# Patient Record
Sex: MALE | Race: BLACK OR AFRICAN AMERICAN | NOT HISPANIC OR LATINO | Employment: PART TIME | URBAN - METROPOLITAN AREA
[De-identification: names, ages, dates, MRNs, and addresses within clinical notes are randomized per-mention and may not be internally consistent; named-entity substitution may affect disease eponyms.]

---

## 2022-04-21 PROBLEM — F60.2 ANTISOCIAL PERSONALITY DISORDER: Status: ACTIVE | Noted: 2017-09-06

## 2022-04-21 PROBLEM — F25.9 SCHIZOAFFECTIVE SCHIZOPHRENIA: Status: ACTIVE | Noted: 2022-04-21

## 2022-04-21 PROBLEM — F12.20 CANNABIS USE DISORDER, MODERATE, DEPENDENCE: Status: ACTIVE | Noted: 2017-06-08

## 2022-04-21 PROBLEM — F31.9 BIPOLAR 1 DISORDER: Status: ACTIVE | Noted: 2022-04-21

## 2022-04-21 PROBLEM — Z72.0 TOBACCO USER: Status: ACTIVE | Noted: 2022-04-21

## 2022-04-21 PROBLEM — M25.511 ACUTE PAIN OF RIGHT SHOULDER: Status: ACTIVE | Noted: 2022-04-21

## 2022-04-21 PROBLEM — F19.151: Status: ACTIVE | Noted: 2017-06-08

## 2022-04-21 PROBLEM — F99 PSYCHIATRIC ILLNESS: Status: ACTIVE | Noted: 2022-04-21

## 2022-04-21 PROBLEM — F19.94: Status: ACTIVE | Noted: 2017-06-08

## 2022-04-21 PROBLEM — R45.851 SUICIDAL IDEATION: Status: ACTIVE | Noted: 2022-04-21

## 2022-04-21 PROBLEM — F15.10 AMPHETAMINE ABUSE: Status: ACTIVE | Noted: 2022-04-21

## 2023-06-21 ENCOUNTER — HOSPITAL ENCOUNTER (INPATIENT)
Facility: HOSPITAL | Age: 38
LOS: 5 days | Discharge: HOME | DRG: 885 | End: 2023-06-26
Attending: PSYCHIATRY & NEUROLOGY | Admitting: PSYCHIATRY & NEUROLOGY
Payer: MEDICAID

## 2023-06-21 DIAGNOSIS — F32.9 MDD (MAJOR DEPRESSIVE DISORDER): ICD-10-CM

## 2023-06-21 PROCEDURE — 11400000 HC PSYCH PRIVATE ROOM

## 2023-06-21 PROCEDURE — 25000003 PHARM REV CODE 250: Performed by: PSYCHIATRY & NEUROLOGY

## 2023-06-21 RX ORDER — LORAZEPAM 2 MG/ML
2 INJECTION INTRAMUSCULAR EVERY 4 HOURS PRN
Status: DISCONTINUED | OUTPATIENT
Start: 2023-06-21 | End: 2023-06-26 | Stop reason: HOSPADM

## 2023-06-21 RX ORDER — MAG HYDROX/ALUMINUM HYD/SIMETH 200-200-20
30 SUSPENSION, ORAL (FINAL DOSE FORM) ORAL EVERY 6 HOURS PRN
Status: DISCONTINUED | OUTPATIENT
Start: 2023-06-21 | End: 2023-06-26 | Stop reason: HOSPADM

## 2023-06-21 RX ORDER — TRAZODONE HYDROCHLORIDE 100 MG/1
100 TABLET ORAL NIGHTLY PRN
Status: DISCONTINUED | OUTPATIENT
Start: 2023-06-21 | End: 2023-06-26 | Stop reason: HOSPADM

## 2023-06-21 RX ORDER — ADHESIVE BANDAGE
30 BANDAGE TOPICAL DAILY PRN
Status: DISCONTINUED | OUTPATIENT
Start: 2023-06-21 | End: 2023-06-26 | Stop reason: HOSPADM

## 2023-06-21 RX ORDER — HYDROXYZINE HYDROCHLORIDE 50 MG/1
50 TABLET, FILM COATED ORAL EVERY 4 HOURS PRN
Status: DISCONTINUED | OUTPATIENT
Start: 2023-06-21 | End: 2023-06-26 | Stop reason: HOSPADM

## 2023-06-21 RX ORDER — LORAZEPAM 1 MG/1
2 TABLET ORAL EVERY 4 HOURS PRN
Status: DISCONTINUED | OUTPATIENT
Start: 2023-06-21 | End: 2023-06-26 | Stop reason: HOSPADM

## 2023-06-21 RX ORDER — DIPHENHYDRAMINE HCL 50 MG
50 CAPSULE ORAL EVERY 4 HOURS PRN
Status: DISCONTINUED | OUTPATIENT
Start: 2023-06-21 | End: 2023-06-26 | Stop reason: HOSPADM

## 2023-06-21 RX ORDER — ACETAMINOPHEN 325 MG/1
650 TABLET ORAL EVERY 6 HOURS PRN
Status: DISCONTINUED | OUTPATIENT
Start: 2023-06-21 | End: 2023-06-26 | Stop reason: HOSPADM

## 2023-06-21 RX ORDER — SERTRALINE HYDROCHLORIDE 50 MG/1
50 TABLET, FILM COATED ORAL DAILY
Status: DISCONTINUED | OUTPATIENT
Start: 2023-06-21 | End: 2023-06-26 | Stop reason: HOSPADM

## 2023-06-21 RX ORDER — QUETIAPINE FUMARATE 100 MG/1
100 TABLET, FILM COATED ORAL NIGHTLY
Status: DISCONTINUED | OUTPATIENT
Start: 2023-06-21 | End: 2023-06-26 | Stop reason: HOSPADM

## 2023-06-21 RX ORDER — DIPHENHYDRAMINE HYDROCHLORIDE 50 MG/ML
50 INJECTION INTRAMUSCULAR; INTRAVENOUS EVERY 4 HOURS PRN
Status: DISCONTINUED | OUTPATIENT
Start: 2023-06-21 | End: 2023-06-26 | Stop reason: HOSPADM

## 2023-06-21 RX ADMIN — QUETIAPINE FUMARATE 100 MG: 100 TABLET ORAL at 08:06

## 2023-06-21 RX ADMIN — ACETAMINOPHEN 650 MG: 325 TABLET ORAL at 08:06

## 2023-06-21 RX ADMIN — SERTRALINE HYDROCHLORIDE 50 MG: 50 TABLET ORAL at 08:06

## 2023-06-21 NOTE — PROGRESS NOTES
"Pedro is a 37 male admitted for Major Depressive Disorder, recurrent, moderate and Amphetamine use disorder, with a -uds. Pt reports ability to perform his  ADL's. CTRS educated Pedro to TR group times and dates, with Pt reporting "I'll try". Pedro reported his TR treatment goal as "try to get into sober living" along with interest in community MH and sobriety services.       06/21/23 0815   General   Admit Date 06/21/23   Primary Diagnosis Major Depressive Disorder, recurrent, moderate   Secondary Diagnosis Amphetamine use disorder   Faith Religion   Number of Children 0   Children Living? 0   Occupation unemployed   Does the patient have dentures? No   If you were to take part in activities, which of the following would you prefer? Both   Do you feel like you have enough to keep you busy now? Yes   Do you believe that you have the opportunity for physical activity? Yes   Activity Capabilities Minimum   Subjective   Patient states go to a sober living house   Assessment   Mobility ambulates independently   Transfers independently   Visual Acuity normal vision   Visual Perception depth perception;color perception;recognizes letters;recognizes numbers   Hearing normal   Speech/Communication normal;difficult to understand   Cognitive Concerns oriented x4   Emotional Concerns critical of self or others   Leisure Interest Survey   Leisure Interest Survey Yes   Solitary Activities   Watching TV Current Interest   Watching Videos Current Interest   Music Listening Current Interest   Outdoor Activities   Fishing Current Interest   Passive Games   Card Games Current Interest   Therapeutic Recreation   Community Reintegration Awareness of accessibility questions to ask when planning outing   Stress Management/Relaxation Training Identifies difficulty adjusting to illness, hospitalization, or pain   Social Skills Demonstrates ability to listen to others   Leisure Education Lacks understanding of value of leisure " involvement in recreation   Leisure Resources Identifies a plan for use of resources upon discharge   Leisure Attitude/Participation With coaxing, participates in group activity and identifies benefits of involvement   Rec Therapy Group Assistance Independent;verbal cues   Problem Solving Activity Assistance Independent;verbal cues   Goals   Additional Documentation yes   Goal Formulation With patient   Time For Goal Achievement 7 days   Goal 1 get into sober living   Plan   Planned Therapy Intervention Group Recreational Therapy   Expected Length of Stay 5-7 days   PT Frequency Minimum of 3 visits per week

## 2023-06-21 NOTE — NURSING
"Daily Nursing Note:      Behavior:    Patient (Pedro Moon is a 37 y.o. male, : 1985, MRN: [id number]) demonstrating an affect that was congruent. Pedro demonstrating mood that is normal. Pedro had an appearance that was disheveled. Pedro denies suicidal ideation. Pedro denies suicide plan. Pedro denies homicidal ideation. Pedro denies hallucinations.    Pedro's  height is 5' 8" (1.727 m) and weight is 74.8 kg (165 lb). His temperature is 98.2 °F (36.8 °C). His blood pressure is 125/78 and his pulse is 82. His respiration is 20 and oxygen saturation is 99%.     Pedro's last BM was noted on: ______      Intervention:    Encourage Pedro to perform self-hygiene, grooming, and changing of clothing. Monitor Pedro's behavior and program compliance. Monitor Pedro for suicidal ideation, homicidal ideation, sleep disturbance, and hallucinations. Encourage Pedro to eat all portions of meals and assess for meal preferences. Monitor Pedro for intake and output to ensure hydration. Notify the Physician/Physician Assistant/Advance Practice Registered Nurse (MD/PA/APRN) for any medication refusal and any change in patient condition.      Response:    Pedro verbalizes understand of unit process and procedures. Pedro reported medications __haven't started any yet.____.      Plan:     Continue to monitor per MD/PA/APRN orders; maintain patient safety.   "

## 2023-06-21 NOTE — NURSING
"Pt admitted to the services of Dr Levin on a PEC with a preliminary diagnosis of Major depression c SI.  Pt transported by [facility] from [facility] ER, body search, skin assessment and contraband check done by MALDONADO Astorga, mht and  present, no contraband or wounds noted, belongings check done by mht, consent forms explained and signed with MALDONADO Astorga, vital signs are stable, pt is not in any physical distress at the current time, Per PEC, Hx of Bipolar D/O, SAD,substance abuse, suicidal ideation after kicked out of sober living. Anxious,hopelessness. Pt states, "I am having suicidal ideations.", currently voicing no homicidal ideations, voicing no hallucinations or  Delusions at this time, voicing no detox symptoms at this time, UDS is neg., allergic  To Haldol, denies medical issues at the current time, last bm was yest., pt will be monitored with suicide prec., for anger and detox symptoms and will be assisted prn.  "

## 2023-06-21 NOTE — H&P
[facility]  History & Physical    Subjective:      No chief complaint on file.       HPI:  Pedro Moon is a 37 y.o. male.    I have bipolar and very depressed. I told them them I was suicidal to get in here to get off the streets. I am homeless.     Past Medical History:   Diagnosis Date    Anxiety     Bipolar disorder     Depression     Digestive disorder     Headache(784.0)     History of psychiatric care     History of psychiatric hospitalization     Psychiatric exam requested by authority     Psychiatric problem     Schizoaffective disorder     Therapy      Past Surgical History:   Procedure Laterality Date    TONSILLECTOMY       Family History   Problem Relation Age of Onset    Depression Father     Drug abuse Father     Dementia Father     Hypertension Father     Drug abuse Maternal Uncle     Alcohol abuse Maternal Uncle     Drug abuse Paternal Uncle     Alcohol abuse Paternal Uncle     ADD / ADHD Cousin     Alcohol abuse Cousin     Drug abuse Cousin      Social History     Tobacco Use    Smoking status: Light Smoker     Packs/day: 0.25     Years: 4.00     Pack years: 1.00     Types: Cigarettes    Smokeless tobacco: Former   Substance Use Topics    Alcohol use: Not Currently    Drug use: Yes     Types: Methamphetamines     Comment: last time used Crystal Meth was about 2 months ago       PTA Medications   Medication Sig    amantadine HCL (SYMMETREL) 100 mg capsule Take 1 capsule (100 mg total) by mouth 2 (two) times a day. (Patient not taking: Reported on 6/20/2023)    citalopram (CELEXA) 20 MG tablet Take 20 mg by mouth once daily.    gabapentin (NEURONTIN) 300 MG capsule Take 300 mg by mouth 2 (two) times a day.    omeprazole (PRILOSEC) 40 MG capsule Take 40 mg by mouth once daily.    propranoloL (INDERAL) 20 MG tablet Take 1 tablet (20 mg total) by mouth once daily. (Patient not taking: Reported on 6/20/2023)    QUEtiapine (SEROQUEL) 50 MG tablet Take 50 mg by  mouth every evening.     Review of patient's allergies indicates:   Allergen Reactions    Haloperidol lactate Other (See Comments)     Shakes        Review of Systems   Constitutional: Negative.    HENT: Negative.     Respiratory: Negative.     Cardiovascular: Negative.    Gastrointestinal: Negative.    Genitourinary: Negative.    Musculoskeletal: Negative.    Skin: Negative.    Neurological: Negative.    Endo/Heme/Allergies: Negative.    Psychiatric/Behavioral:  Positive for depression. Negative for hallucinations.      Objective:      Vital Signs (Most Recent)  Temp: 98.2 °F (36.8 °C) (06/21/23 0800)  Pulse: 82 (06/21/23 0800)  Resp: 20 (06/21/23 0800)  BP: 125/78 (06/21/23 0800)  SpO2: 99 % (06/21/23 0728)    Vital Signs Range (Last 24H):  Temp:  [97.7 °F (36.5 °C)-99.1 °F (37.3 °C)]   Pulse:  []   Resp:  [16-20]   BP: (125-157)/(74-93)   SpO2:  [98 %-100 %]     Physical Exam  Vitals reviewed.   Constitutional:       Interventions: He is sedated.   HENT:      Head: Normocephalic.      Nose: Nose normal.      Mouth/Throat:      Mouth: Mucous membranes are moist.      Dentition: Dental caries (Numerous severe caries to gingival line) present.      Pharynx: Oropharynx is clear.   Eyes:      Extraocular Movements: Extraocular movements intact.      Pupils: Pupils are equal, round, and reactive to light.   Cardiovascular:      Rate and Rhythm: Normal rate and regular rhythm.      Heart sounds: Normal heart sounds.   Pulmonary:      Effort: Pulmonary effort is normal.      Breath sounds: Normal breath sounds.   Abdominal:      General: Abdomen is flat. Bowel sounds are normal.      Palpations: Abdomen is soft.   Musculoskeletal:         General: Normal range of motion.      Right shoulder: Deformity (Looks like old healed clavicular fracture) present.      Cervical back: Normal range of motion and neck supple.   Skin:     General: Skin is warm and dry.      Comments: Few tattoos   Neurological:      General: No  focal deficit present.      Mental Status: He is alert.   Psychiatric:         Mood and Affect: Mood is depressed.         Behavior: Behavior is cooperative.       Data Review:    Recent Results (from the past 48 hour(s))   Comprehensive metabolic panel    Collection Time: 06/20/23 10:42 PM   Result Value Ref Range    Sodium Level 140 136 - 145 mmol/L    Potassium Level 3.7 3.5 - 5.1 mmol/L    Chloride 106 98 - 107 mmol/L    Carbon Dioxide 24 22 - 29 mmol/L    Glucose Level 112 (H) 74 - 100 mg/dL    Blood Urea Nitrogen 15.2 8.9 - 20.6 mg/dL    Creatinine 0.77 0.73 - 1.18 mg/dL    Calcium Level Total 9.4 8.4 - 10.2 mg/dL    Protein Total 6.7 6.4 - 8.3 gm/dL    Albumin Level 4.5 3.5 - 5.0 g/dL    Globulin 2.2 (L) 2.4 - 3.5 gm/dL    Albumin/Globulin Ratio 2.0 1.1 - 2.0 ratio    Bilirubin Total 0.2 <=1.5 mg/dL    Alkaline Phosphatase 69 40 - 150 unit/L    Alanine Aminotransferase 25 0 - 55 unit/L    Aspartate Aminotransferase 17 5 - 34 unit/L    eGFR >60 mls/min/1.73/m2   TSH    Collection Time: 06/20/23 10:42 PM   Result Value Ref Range    Thyroid Stimulating Hormone 1.676 0.350 - 4.940 uIU/mL   Ethanol    Collection Time: 06/20/23 10:42 PM   Result Value Ref Range    Ethanol Level <10.0 <=10.0 mg/dL   Acetaminophen level    Collection Time: 06/20/23 10:42 PM   Result Value Ref Range    Acetaminophen Level <17.4 (L) 17.4 - 30.0 ug/ml   CBC with Differential    Collection Time: 06/20/23 10:42 PM   Result Value Ref Range    WBC 10.87 4.50 - 11.50 x10(3)/mcL    RBC 5.09 4.70 - 6.10 x10(6)/mcL    Hgb 13.5 (L) 14.0 - 18.0 g/dL    Hct 42.4 42.0 - 52.0 %    MCV 83.3 80.0 - 94.0 fL    MCH 26.5 (L) 27.0 - 31.0 pg    MCHC 31.8 (L) 33.0 - 36.0 g/dL    RDW 13.6 11.5 - 17.0 %    Platelet 305 130 - 400 x10(3)/mcL    MPV 9.6 7.4 - 10.4 fL    Neut % 51.3 %    Lymph % 30.3 %    Mono % 10.6 %    Eos % 7.0 %    Basophil % 0.5 %    Lymph # 3.29 0.6 - 4.6 x10(3)/mcL    Neut # 5.59 2.1 - 9.2 x10(3)/mcL    Mono # 1.15 0.1 - 1.3 x10(3)/mcL     Eos # 0.76 0 - 0.9 x10(3)/mcL    Baso # 0.05 <=0.2 x10(3)/mcL    IG# 0.03 0 - 0.04 x10(3)/mcL    IG% 0.3 %    NRBC% 0.0 %   Urinalysis, Reflex to Urine Culture    Collection Time: 06/20/23 10:56 PM    Specimen: Urine   Result Value Ref Range    Color, UA Dark Yellow Yellow, Light-Yellow, Dark Yellow, Emma, Straw    Appearance, UA Clear Clear    Specific Gravity, UA 1.039 (H) 1.005 - 1.030    pH, UA 5.5 5.0 - 8.5    Protein, UA 1+ (A) Negative mg/dL    Glucose, UA Negative Negative, Normal mg/dL    Ketones, UA Trace (A) Negative mg/dL    Blood, UA Negative Negative unit/L    Bilirubin, UA 1+ (A) Negative mg/dL    Urobilinogen, UA 1.0 0.2, 1.0, Normal mg/dL    Nitrites, UA Negative Negative    Leukocyte Esterase, UA Negative Negative unit/L   Drug Screen, Urine    Collection Time: 06/20/23 10:56 PM   Result Value Ref Range    Amphetamines, Urine Negative Negative    Barbituates, Urine Negative Negative    Benzodiazepine, Urine Negative Negative    Cannabinoids, Urine Negative Negative    Cocaine, Urine Negative Negative    Fentanyl, Urine Negative Negative    MDMA, Urine Negative Negative    Opiates, Urine Negative Negative    Phencyclidine, Urine Negative Negative    pH, Urine 5.5 3.0 - 11.0    Specific Gravity, Urine Auto 1.039 (H) 1.001 - 1.035   COVID-19 Rapid Screening    Collection Time: 06/20/23 10:56 PM   Result Value Ref Range    SARS COV-2 MOLECULAR Negative Negative   Urinalysis, Microscopic    Collection Time: 06/20/23 10:56 PM   Result Value Ref Range    RBC, UA <5 <=5 /HPF    WBC, UA <5 <=5 /HPF    Squamous Epithelial Cells, UA <5 <=5 /HPF    Bacteria, UA None Seen None Seen, Rare, Occasional /HPF     ECG: No results found for this or any previous visit.   IMAGING: No results found.     Assessment:      Active Hospital Problems    Diagnosis  POA    Amphetamine abuse [F15.10]  Yes    Tobacco user [Z72.0]  Yes    Bipolar 1 disorder [F31.9]  Yes      Resolved Hospital Problems   No resolved problems to  display.       Plan:      Plan per psychiatrist

## 2023-06-21 NOTE — CARE UPDATE
Treatment Team    Pt seem for treatment team today with interdisciplinary team.  Pt is Cooperative and Anxious with Tx team. Pt denies symptoms at this time. MD did not change pt meds at this time. Treatment teams goals Not met at this time. Pt DC plan is sober living. DC date scheduled for 6.27.2023.

## 2023-06-21 NOTE — H&P
"6/21/2023  Pedro Moon   1985   [id number]            Psychiatry Inpatient Admission Note    Date of Admission: 6/21/2023  7:35 AM    Current Legal Status: Physician's Emergency Certificate    Chief Complaint: "I come out here to go to a sober living house"    SUBJECTIVE:   History of Present Illness:   Pedro Moon is a 37 y.o. male placed under a PEC at [facility] due to reported suicidal ideation.  He had recently been discharged from a rehab for methamphetamine use.    Patient states that he "came out here to go to a sober living house."  However, he states that someone was attempting to talk to him but wasn't directly addressing him and he was unhappy with this.  He states that he was at [facility], then [facility] but left because there were roaches.  He then went back to [facility] and was sent to sober living.  This all took place yesterday.  His current discharge plan is to go to sober living at discharge.  Admitted for medication management and safety monitoring.    UDS: (-)  Blood alcohol: <10      Past Psychiatric History:   Previous Psychiatric Hospitalizations: Yes.  Last 5 weeks ago in [place].  Previous Medication Trials: Yes  Previous Suicide Attempts: Denies   Outpatient psychiatrist: None current    Past Medical/Surgical History:   Past Medical History:   Diagnosis Date    Anxiety     Bipolar disorder     Depression     Digestive disorder     Headache(784.0)     History of psychiatric care     History of psychiatric hospitalization     Psychiatric exam requested by authority     Psychiatric problem     Schizoaffective disorder     Therapy      No past surgical history on file.      Family Psychiatric History:   Father - Depression     Allergies:   Review of patient's allergies indicates:   Allergen Reactions    Haloperidol lactate Other (See Comments)     Shakes       Substance Abuse History:   Tobacco: <1ppd  Alcohol: Denies  Illicit Substances: History of methamphetamine " "use  Treatment: Yes      Current Medications:   Home Psychiatric Meds: Celexa 20mg daily, Seroquel 50mg HS, Trazodone 100mg HS    Scheduled Meds:    PRN Meds: acetaminophen, aluminum-magnesium hydroxide-simethicone, diphenhydrAMINE **AND** LORazepam, diphenhydrAMINE, hydrOXYzine HCL, lorazepam, magnesium hydroxide 400 mg/5 ml, trazodone   Psychotherapeutics (From admission, onward)      Start     Stop Route Frequency Ordered    06/21/23 0736  traZODone tablet 100 mg         -- Oral Nightly PRN 06/21/23 0736    06/21/23 0736  LORazepam injection 2 mg         -- IM Every 4 hours PRN 06/21/23 0736    06/21/23 0736  LORazepam tablet 2 mg  (Med - Acute  Behavioral Management)        See Chen for full Linked Orders Report.    -- Oral Every 4 hours PRN 06/21/23 0736              Social History:  Housing Status: Homeless  Relationship Status/Sexual Orientation:    Children: None  Education: 11th grade   Employment Status/Info: Disabled    history: Basic training  History of physical/sexual abuse: Denies   Access to gun: Denies       Legal History:   Past Charges/Incarcerations: "Thieving"   Pending charges: Denies      OBJECTIVE:   Medical Review Of Systems:  Constitutional: negative  Respiratory: negative  Cardiovascular: negative  Gastrointestinal: negative  Genitourinary:negative  Musculoskeletal:negative  Neurological: negative    Vitals   There were no vitals filed for this visit.     Labs/Imaging/Studies:   Recent Results (from the past 48 hour(s))   Comprehensive metabolic panel    Collection Time: 06/20/23 10:42 PM   Result Value Ref Range    Sodium Level 140 136 - 145 mmol/L    Potassium Level 3.7 3.5 - 5.1 mmol/L    Chloride 106 98 - 107 mmol/L    Carbon Dioxide 24 22 - 29 mmol/L    Glucose Level 112 (H) 74 - 100 mg/dL    Blood Urea Nitrogen 15.2 8.9 - 20.6 mg/dL    Creatinine 0.77 0.73 - 1.18 mg/dL    Calcium Level Total 9.4 8.4 - 10.2 mg/dL    Protein Total 6.7 6.4 - 8.3 gm/dL    Albumin " Level 4.5 3.5 - 5.0 g/dL    Globulin 2.2 (L) 2.4 - 3.5 gm/dL    Albumin/Globulin Ratio 2.0 1.1 - 2.0 ratio    Bilirubin Total 0.2 <=1.5 mg/dL    Alkaline Phosphatase 69 40 - 150 unit/L    Alanine Aminotransferase 25 0 - 55 unit/L    Aspartate Aminotransferase 17 5 - 34 unit/L    eGFR >60 mls/min/1.73/m2   TSH    Collection Time: 06/20/23 10:42 PM   Result Value Ref Range    Thyroid Stimulating Hormone 1.676 0.350 - 4.940 uIU/mL   Ethanol    Collection Time: 06/20/23 10:42 PM   Result Value Ref Range    Ethanol Level <10.0 <=10.0 mg/dL   Acetaminophen level    Collection Time: 06/20/23 10:42 PM   Result Value Ref Range    Acetaminophen Level <17.4 (L) 17.4 - 30.0 ug/ml   CBC with Differential    Collection Time: 06/20/23 10:42 PM   Result Value Ref Range    WBC 10.87 4.50 - 11.50 x10(3)/mcL    RBC 5.09 4.70 - 6.10 x10(6)/mcL    Hgb 13.5 (L) 14.0 - 18.0 g/dL    Hct 42.4 42.0 - 52.0 %    MCV 83.3 80.0 - 94.0 fL    MCH 26.5 (L) 27.0 - 31.0 pg    MCHC 31.8 (L) 33.0 - 36.0 g/dL    RDW 13.6 11.5 - 17.0 %    Platelet 305 130 - 400 x10(3)/mcL    MPV 9.6 7.4 - 10.4 fL    Neut % 51.3 %    Lymph % 30.3 %    Mono % 10.6 %    Eos % 7.0 %    Basophil % 0.5 %    Lymph # 3.29 0.6 - 4.6 x10(3)/mcL    Neut # 5.59 2.1 - 9.2 x10(3)/mcL    Mono # 1.15 0.1 - 1.3 x10(3)/mcL    Eos # 0.76 0 - 0.9 x10(3)/mcL    Baso # 0.05 <=0.2 x10(3)/mcL    IG# 0.03 0 - 0.04 x10(3)/mcL    IG% 0.3 %    NRBC% 0.0 %   Urinalysis, Reflex to Urine Culture    Collection Time: 06/20/23 10:56 PM    Specimen: Urine   Result Value Ref Range    Color, UA Dark Yellow Yellow, Light-Yellow, Dark Yellow, Emma, Straw    Appearance, UA Clear Clear    Specific Gravity, UA 1.039 (H) 1.005 - 1.030    pH, UA 5.5 5.0 - 8.5    Protein, UA 1+ (A) Negative mg/dL    Glucose, UA Negative Negative, Normal mg/dL    Ketones, UA Trace (A) Negative mg/dL    Blood, UA Negative Negative unit/L    Bilirubin, UA 1+ (A) Negative mg/dL    Urobilinogen, UA 1.0 0.2, 1.0, Normal mg/dL     Nitrites, UA Negative Negative    Leukocyte Esterase, UA Negative Negative unit/L   Drug Screen, Urine    Collection Time: 06/20/23 10:56 PM   Result Value Ref Range    Amphetamines, Urine Negative Negative    Barbituates, Urine Negative Negative    Benzodiazepine, Urine Negative Negative    Cannabinoids, Urine Negative Negative    Cocaine, Urine Negative Negative    Fentanyl, Urine Negative Negative    MDMA, Urine Negative Negative    Opiates, Urine Negative Negative    Phencyclidine, Urine Negative Negative    pH, Urine 5.5 3.0 - 11.0    Specific Gravity, Urine Auto 1.039 (H) 1.001 - 1.035   COVID-19 Rapid Screening    Collection Time: 06/20/23 10:56 PM   Result Value Ref Range    SARS COV-2 MOLECULAR Negative Negative   Urinalysis, Microscopic    Collection Time: 06/20/23 10:56 PM   Result Value Ref Range    RBC, UA <5 <=5 /HPF    WBC, UA <5 <=5 /HPF    Squamous Epithelial Cells, UA <5 <=5 /HPF    Bacteria, UA None Seen None Seen, Rare, Occasional /HPF      No results found for: PHENYTOIN, PHENOBARB, VALPROATE, CBMZ        Psychiatric Mental Status Exam:  General Appearance: appears stated age, well-developed, well-nourished  Arousal: alert  Behavior: cooperative  Movements and Motor Activity: no abnormal involuntary movements noted  Orientation: oriented to person, place, time, and situation  Speech: normal rate, normal rhythm, normal volume, normal tone  Mood: Depressed  Affect: mood-congruent, constricted  Thought Process: linear  Associations: intact  Thought Content and Perceptions: (+)suicidal ideation, no homicidal ideation, no auditory hallucinations, no visual hallucinations, no paranoid ideation, no ideas of reference  Recent and Remote Memory: recent memory intact, remote memory intact; per interview/observation with patient  Attention and Concentration: intact, attentive to conversation; per interview/observation with patient  Fund of Knowledge: intact, aware of current events, vocabulary appropriate;  based on history, vocabulary, fund of knowledge, syntax, grammar, and content  Insight: questionable; based on understanding of severity of illness and HPI  Judgment: questionable; based on patient's behavior and HPI        Patient Strengths:  Access to care and Able to verbalize needs      Patient Liabilities:  Substance use, Depression, and Housing stressors      Discharge Criteria:  Improved mood, Medication compliance, Overall functional improvement, and Improved coping skills      Reason for Admission:  The patient poses a significant and immediate danger to self., The psychiatric disorder requires intensive treatment that necessitates 24 hour observation and care., and The patient can demonstrate a reasonable expectation of improvement in his/her disorder or condition as a result of active treatment being provided.    ASSESSMENT/PLAN:   Diagnoses:  Major Depressive Disorder, recurrent, moderate (F33.1)  Insomnia (F51.01)  Amphetamine use disorder (F15.20)    Past Medical History:   Diagnosis Date    Anxiety     Bipolar disorder     Depression     Digestive disorder     Headache(784.0)     History of psychiatric care     History of psychiatric hospitalization     Psychiatric exam requested by authority     Psychiatric problem     Schizoaffective disorder     Therapy           Problem lists and Management Plans:  -Admit to [facility]  -Will attempt to obtain outside psychiatric records if available  - to assist with aftercare planning and collateral  -Continue inpatient treatment as evidenced by danger to self      Depression  -Zoloft 50mg daily    Insomnia  -Seroquel 100mg HS    Amphetamine use  -Group/Individual psychotherapy      Estimated length of stay: 5-7 days    Estimated Disposition:  Sober living    Estimated Follow-up: Outpatient medication management      On this date, I have reviewed the medical history and Nursing Assessment, as well as records from referral source.  I have evaluated the  mental status of the above named person and concur with the findings of all assessments.  I have provided medical direction for the development of the Treatment Plan.    I conclude that this patient meets admission criteria for inpatient treatment.  I certify that this patient poses a danger to self or others, or would otherwise be considered gravely disabled based on this assessment and/or provided collateral information.     I have provided medical direction for the development of the Treatment plan.  These services will be provided while this patient is under my care and will be based on an individualized plan of care.  The patient can demonstrate a reasonable expectation of improvement in his/her disorder as a result of the active treatment being provided.      Clifford Levin M.D.

## 2023-06-21 NOTE — HPI
I have bipolar and very depressed. I told them them I was suicidal to get in here to get off the streets. I am homeless.

## 2023-06-21 NOTE — PLAN OF CARE
Psychosocial Assessment     Pt is a 37 y.o. YO  male admitted due to depression, SI. Pt UDS was Negative for substances.  Pt ETOH < 10. Pt reports  SI at this time. Pt last inpatient  was 5 weeks ago . Pt presents Cooperative with CM staff 1:1. Pt originally from [place], [place]  . Pt has  0 dependents. Pt  is  .  Pt completed 11th grade. Pt denies  service.  Pt denies financial issues. Pt disabled.  Pt works at  disabled. Pt denies legal issues. Pt states they do  receive comfort from spiritual practices. Pt emergency contact is denied.  . Pt discharge plan at this time is sober living.    06/21/23 1005   Initial Information   Source of Information patient   Reason for Admission depression   Patient Aware of Diagnosis yes   Arrived From emergency department   Spiritual Beliefs   Spiritual, Cultural Beliefs, Orthodoxy Practices, Values that Affect Care yes   Substance Use/Withdrawal   Substance Use Denies use;Active in recovery   Additional Tobacco Use   How many cigarettes do you typically have per day? 10   Abuse Screen (yes response referral indicated)   Feels Unsafe at Home or Work/School no   Feels Threatened by Someone no   Does anyone try to keep you from having contact with others or doing things outside your home? no   Physical Signs of Abuse Present no   Abuse Details   Physical Abuse No   Sexual Abuse No   Emotional Abuse No   If applicable, has the abuse been reported? No   AUDIT-C (Alcohol Use Disorders ID Test)   Alcohol Use In Past Year 1-->monthly or less   Alcohol Amount Per Day In Past Year 0-->one or two   More Than 6 Drinks On One Occasion In Past Year 0-->never   Total Audit C Score 1

## 2023-06-21 NOTE — NURSING
Treatment Team Note:      Behavior:    Patient (Pedro Moon is a 37 y.o. male, : 1985, MRN: [id number]) demonstrating an affect that was congruent. Pedro demonstrating mood that is normal. Pedro had an appearance that was clean. Pedro denies suicidal ideation. Pedro denies suicide plan. Pedro denies hallucinations.      Intervention:    Encourage Pedro to perform self-hygiene, grooming, and changing of clothing. Encourage Pedro to attend all scheduled groups. Monitor Pedro's behavior and program compliance. Monitor Pedro for suicidal ideation, homicidal ideation, sleep disturbance, and hallucinations. Encourage Pedro to eat all portions of meals and assess for meal preferences. Monitor Pedro for intake and output to ensure hydration. Notify the Physician/Physician Assistant/Advance Practice Registered Nurse (MD/PA/APRN) for any medication refusal and any change in patient condition.    Discussed with Pedro course of treatment. Discussed with Pedro medications ordered and schedule of medications. Discussed collateral contact with Pedro.      Response    Pedro's response to treatment team meeting was cooperative.  Dr Levin asked Pedro what bought him here.  Pedro stated he was at sober living and then went to ER.  Dr Levin stated patient on Zoloft and Serequel      Plan:     Continue to monitor per MD/PA/APRN orders; maintain patient safety.

## 2023-06-21 NOTE — PLAN OF CARE
Problem: Adult Inpatient Plan of Care  Goal: Plan of Care Review  Outcome: Ongoing, Not Progressing  Goal: Patient-Specific Goal (Individualized)  Outcome: Ongoing, Not Progressing  Goal: Absence of Hospital-Acquired Illness or Injury  Outcome: Ongoing, Not Progressing  Goal: Optimal Comfort and Wellbeing  Outcome: Ongoing, Not Progressing  Goal: Readiness for Transition of Care  Outcome: Ongoing, Not Progressing     Problem: Violence Risk or Actual  Goal: Anger and Impulse Control  Outcome: Ongoing, Not Progressing     Problem: Activity and Energy Impairment (Depressive Signs/Symptoms)  Goal: Optimized Energy Level (Depressive Signs/Symptoms)  Outcome: Ongoing, Not Progressing     Problem: Cognitive Impairment (Depressive Signs/Symptoms)  Goal: Optimized Cognitive Function  Outcome: Ongoing, Not Progressing     Problem: Decreased Participation/Engagement (Depressive Signs/Symptoms)  Goal: Increased Participation and Engagement (Depressive Signs/Symptoms)  Outcome: Ongoing, Not Progressing     Problem: Feelings of Worthlessness, Hopelessness or Excessive Guilt (Depressive Signs/Symptoms)  Goal: Enhanced Self-Esteem and Confidence (Depressive Signs/Symptoms)  Outcome: Ongoing, Not Progressing     Problem: Mood Impairment (Depressive Signs/Symptoms)  Goal: Improved Mood Symptoms (Depressive Signs/Symptoms)  Outcome: Ongoing, Not Progressing     Problem: Nutrition Imbalance (Depressive Signs/Symptoms)  Goal: Optimized Nutrition Intake  Outcome: Ongoing, Not Progressing     Problem: Psychomotor Impairment (Depressive Signs/Symptoms)  Goal: Improved Psychomotor Symptoms (Depressive Signs/Symptoms)  Outcome: Ongoing, Not Progressing     Problem: Sleep Disturbance (Depressive Signs/Symptoms)  Goal: Improved Sleep (Depressive Signs/Symptoms)  Outcome: Ongoing, Not Progressing     Problem: Social, Occupational or Functional Impairment (Depressive Signs/Symptoms)  Goal: Enhanced Social, Occupational or Functional Skills  (Depressive Signs/Symptoms)  Outcome: Ongoing, Not Progressing     Problem: Activity and Energy Impairment (Excessive Substance Use)  Goal: Optimized Energy Level (Excessive Substance Use)  Outcome: Ongoing, Not Progressing     Problem: Behavior Regulation Impairment (Excessive Substance Use)  Goal: Improved Behavioral Control (Excessive Substance Use)  Outcome: Ongoing, Not Progressing     Problem: Decreased Participation and Engagement (Excessive Substance Use)  Goal: Increased Participation and Engagement (Excessive Substance Use)  Outcome: Ongoing, Not Progressing     Problem: Physiologic Impairment (Excessive Substance Use)  Goal: Improved Physiologic Symptoms (Excessive Substance Use)  Outcome: Ongoing, Not Progressing     Problem: Social, Occupational or Functional Impairment (Excessive Substance Use)  Goal: Enhanced Social, Occupational or Functional Skills (Excessive Substance Use)  Outcome: Ongoing, Not Progressing

## 2023-06-22 LAB
BASOPHILS # BLD AUTO: 0.03 X10(3)/MCL
BASOPHILS NFR BLD AUTO: 0.4 %
EOSINOPHIL # BLD AUTO: 0.63 X10(3)/MCL (ref 0–0.9)
EOSINOPHIL NFR BLD AUTO: 8.9 %
ERYTHROCYTE [DISTWIDTH] IN BLOOD BY AUTOMATED COUNT: 13.6 % (ref 11.5–17)
EST. AVERAGE GLUCOSE BLD GHB EST-MCNC: 108.3 MG/DL
GLUCOSE P FAST SERPL-MCNC: 92 MG/DL (ref 70–155)
HBA1C MFR BLD: 5.4 %
HCT VFR BLD AUTO: 43.3 % (ref 42–52)
HGB BLD-MCNC: 13.8 G/DL (ref 14–18)
IMM GRANULOCYTES # BLD AUTO: 0.02 X10(3)/MCL (ref 0–0.04)
IMM GRANULOCYTES NFR BLD AUTO: 0.3 %
LYMPHOCYTES # BLD AUTO: 2.5 X10(3)/MCL (ref 0.6–4.6)
LYMPHOCYTES NFR BLD AUTO: 35.3 %
MCH RBC QN AUTO: 26.7 PG (ref 27–31)
MCHC RBC AUTO-ENTMCNC: 31.9 G/DL (ref 33–36)
MCV RBC AUTO: 83.9 FL (ref 80–94)
MONOCYTES # BLD AUTO: 0.63 X10(3)/MCL (ref 0.1–1.3)
MONOCYTES NFR BLD AUTO: 8.9 %
NEUTROPHILS # BLD AUTO: 3.27 X10(3)/MCL (ref 2.1–9.2)
NEUTROPHILS NFR BLD AUTO: 46.2 %
NRBC BLD AUTO-RTO: 0 %
PLATELET # BLD AUTO: 279 X10(3)/MCL (ref 130–400)
PMV BLD AUTO: 10.2 FL (ref 7.4–10.4)
RBC # BLD AUTO: 5.16 X10(6)/MCL (ref 4.7–6.1)
T PALLIDUM AB SER QL: NONREACTIVE
WBC # SPEC AUTO: 7.08 X10(3)/MCL (ref 4.5–11.5)

## 2023-06-22 PROCEDURE — 11400000 HC PSYCH PRIVATE ROOM

## 2023-06-22 PROCEDURE — 83036 HEMOGLOBIN GLYCOSYLATED A1C: CPT | Performed by: PSYCHIATRY & NEUROLOGY

## 2023-06-22 PROCEDURE — 25000003 PHARM REV CODE 250: Performed by: PSYCHIATRY & NEUROLOGY

## 2023-06-22 PROCEDURE — S4991 NICOTINE PATCH NONLEGEND: HCPCS | Performed by: PSYCHIATRY & NEUROLOGY

## 2023-06-22 PROCEDURE — 86780 TREPONEMA PALLIDUM: CPT | Performed by: PSYCHIATRY & NEUROLOGY

## 2023-06-22 PROCEDURE — 82947 ASSAY GLUCOSE BLOOD QUANT: CPT | Performed by: PSYCHIATRY & NEUROLOGY

## 2023-06-22 PROCEDURE — 25000003 PHARM REV CODE 250: Performed by: PEDIATRICS

## 2023-06-22 PROCEDURE — 85025 COMPLETE CBC W/AUTO DIFF WBC: CPT | Performed by: PSYCHIATRY & NEUROLOGY

## 2023-06-22 RX ORDER — PANTOPRAZOLE SODIUM 40 MG/1
40 TABLET, DELAYED RELEASE ORAL DAILY
Status: DISCONTINUED | OUTPATIENT
Start: 2023-06-22 | End: 2023-06-26 | Stop reason: HOSPADM

## 2023-06-22 RX ORDER — IBUPROFEN 200 MG
1 TABLET ORAL DAILY
Status: DISCONTINUED | OUTPATIENT
Start: 2023-06-22 | End: 2023-06-26 | Stop reason: HOSPADM

## 2023-06-22 RX ADMIN — SERTRALINE HYDROCHLORIDE 50 MG: 50 TABLET ORAL at 08:06

## 2023-06-22 RX ADMIN — ALUMINUM HYDROXIDE, MAGNESIUM HYDROXIDE, AND SIMETHICONE 30 ML: 200; 200; 20 SUSPENSION ORAL at 03:06

## 2023-06-22 RX ADMIN — NICOTINE 1 PATCH: 21 PATCH, EXTENDED RELEASE TRANSDERMAL at 08:06

## 2023-06-22 RX ADMIN — PANTOPRAZOLE SODIUM 40 MG: 40 TABLET, DELAYED RELEASE ORAL at 07:06

## 2023-06-22 RX ADMIN — QUETIAPINE FUMARATE 100 MG: 100 TABLET ORAL at 08:06

## 2023-06-22 RX ADMIN — ACETAMINOPHEN 650 MG: 325 TABLET ORAL at 08:06

## 2023-06-22 NOTE — GROUP NOTE
Group Psychotherapy       Group Focus: Psychiatric Medication Education      Number of patients in attendance: 9    Group Start Time: 2030  Group End Time:  2100  Groups Date: 6/21/2023  Group Topic:  Behavioral Health  Group Department: [facility]  Group Facilitators:  Celina Mckeon RN  _____________________________________________________________________    Patient Name: Pedro Moon  MRN: [id number]  Patient Class: IP- Psych   Admission Date\Time: 6/21/2023  7:35 AM  Hospital Length of Stay: 0  Primary Care Provider: Primary Doctor No     Referred by: Acute Psychiatry Unit Treatment Team     Target symptoms: Depression and Anxiety     Patient's response to treatment: Active Listening     Progress toward goals: Progressing slowly     Interval History:      Diagnosis: Major Depressive Disorder, recurrent, severe; Insomnia; Amphetamine Use D/O     Plan: Continue treatment on APU

## 2023-06-22 NOTE — GROUP NOTE
Group Psychotherapy       Group Focus: Communication Skills   Group topic: Communication Skills: Therapist explored patients need for increasing communication skills through assertiveness or active listening. Patient was able to discuss examples of bad communication and methods to improve communication in their own lives. Patient continues to make progress towards treatment goals.     Number of patients in attendance: 5    Group Start Time: 1045  Group End Time:  1130  Groups Date: 6/22/2023  Group Topic:  Behavioral Health  Group Department: [facility] - Behavioral [facility] Unit  Group Facilitators:  Rica Fink  _____________________________________________________________________    Patient Name: Pedro Moon  MRN: [id number]  Patient Class: IP- Psych   Admission Date\Time: 6/21/2023  7:35 AM  Hospital Length of Stay: 1  Primary Care Provider: Primary Doctor No     Referred by: Acute Psychiatry Unit Treatment Team     Target symptoms: Substance Abuse     Patient's response to treatment: Self-disclosure     Progress toward goals: Progressing slowly     Interval History:      Diagnosis:      Plan: Continue treatment on APU

## 2023-06-22 NOTE — PLAN OF CARE
Problem: Adult Inpatient Plan of Care  Goal: Plan of Care Review  Outcome: Ongoing, Progressing  Goal: Patient-Specific Goal (Individualized)  Outcome: Ongoing, Progressing  Goal: Absence of Hospital-Acquired Illness or Injury  Outcome: Ongoing, Progressing  Goal: Optimal Comfort and Wellbeing  Outcome: Ongoing, Progressing  Goal: Readiness for Transition of Care  Outcome: Ongoing, Progressing     Problem: Violence Risk or Actual  Goal: Anger and Impulse Control  Outcome: Ongoing, Progressing     Problem: Activity and Energy Impairment (Depressive Signs/Symptoms)  Goal: Optimized Energy Level (Depressive Signs/Symptoms)  Outcome: Ongoing, Progressing     Problem: Cognitive Impairment (Depressive Signs/Symptoms)  Goal: Optimized Cognitive Function  Outcome: Ongoing, Progressing     Problem: Decreased Participation/Engagement (Depressive Signs/Symptoms)  Goal: Increased Participation and Engagement (Depressive Signs/Symptoms)  Outcome: Ongoing, Progressing     Problem: Feelings of Worthlessness, Hopelessness or Excessive Guilt (Depressive Signs/Symptoms)  Goal: Enhanced Self-Esteem and Confidence (Depressive Signs/Symptoms)  Outcome: Ongoing, Progressing     Problem: Mood Impairment (Depressive Signs/Symptoms)  Goal: Improved Mood Symptoms (Depressive Signs/Symptoms)  Outcome: Ongoing, Progressing     Problem: Nutrition Imbalance (Depressive Signs/Symptoms)  Goal: Optimized Nutrition Intake  Outcome: Ongoing, Progressing     Problem: Psychomotor Impairment (Depressive Signs/Symptoms)  Goal: Improved Psychomotor Symptoms (Depressive Signs/Symptoms)  Outcome: Ongoing, Progressing     Problem: Sleep Disturbance (Depressive Signs/Symptoms)  Goal: Improved Sleep (Depressive Signs/Symptoms)  Outcome: Ongoing, Progressing     Problem: Social, Occupational or Functional Impairment (Depressive Signs/Symptoms)  Goal: Enhanced Social, Occupational or Functional Skills (Depressive Signs/Symptoms)  Outcome: Ongoing,  Progressing     Problem: Activity and Energy Impairment (Excessive Substance Use)  Goal: Optimized Energy Level (Excessive Substance Use)  Outcome: Ongoing, Progressing     Problem: Behavior Regulation Impairment (Excessive Substance Use)  Goal: Improved Behavioral Control (Excessive Substance Use)  Outcome: Ongoing, Progressing     Problem: Decreased Participation and Engagement (Excessive Substance Use)  Goal: Increased Participation and Engagement (Excessive Substance Use)  Outcome: Ongoing, Progressing     Problem: Physiologic Impairment (Excessive Substance Use)  Goal: Improved Physiologic Symptoms (Excessive Substance Use)  Outcome: Ongoing, Progressing     Problem: Social, Occupational or Functional Impairment (Excessive Substance Use)  Goal: Enhanced Social, Occupational or Functional Skills (Excessive Substance Use)  Outcome: Ongoing, Progressing

## 2023-06-22 NOTE — PROGRESS NOTES
6/22/2023  Pedro Moon   1985   [id number]        Psychiatry Progress Note     Chief Complaint: I just said that I was suicidal    SUBJECTIVE:   Pedro Moon is a 37 y.o. male placed under a P[facility] at [facility] due to reported suicidal ideation.  He had recently been discharged from a rehab for methamphetamine use.    Today patient was very elevated but in a good mood. He denies any depression or anxiety. He states that he said that so that he wouldn't be homeless. Patient states that he has no thoughts of wanting to harm himself today. Patient was smiling and laughing appropriately during the exam. He did display forced rapid speech but at this time I am unsure if this is his normal personality or if he is manic. Will continue to monitor and address if this becomes an issue.        Current Medications:   Scheduled Meds:    nicotine  1 patch Transdermal Daily    QUEtiapine  100 mg Oral QHS    sertraline  50 mg Oral Daily      PRN Meds: acetaminophen, aluminum-magnesium hydroxide-simethicone, diphenhydrAMINE **AND** LORazepam, diphenhydrAMINE, hydrOXYzine HCL, lorazepam, magnesium hydroxide 400 mg/5 ml, trazodone   Psychotherapeutics (From admission, onward)      Start     Stop Route Frequency Ordered    06/21/23 2100  QUEtiapine tablet 100 mg         -- Oral Nightly 06/21/23 0830    06/21/23 0900  sertraline tablet 50 mg         -- Oral Daily 06/21/23 0830    06/21/23 0736  traZODone tablet 100 mg         -- Oral Nightly PRN 06/21/23 0736    06/21/23 0736  LORazepam injection 2 mg         -- IM Every 4 hours PRN 06/21/23 0736    06/21/23 0736  LORazepam tablet 2 mg  (Med - Acute  Behavioral Management)        See Hyperspace for full Linked Orders Report.    -- Oral Every 4 hours PRN 06/21/23 0736            Allergies:   Review of patient's allergies indicates:   Allergen Reactions    Haloperidol lactate Other (See Comments)     Shakes        OBJECTIVE:   Vitals   Vitals:    06/22/23 0701   BP: 128/88   Pulse: 91    Resp: 18   Temp: 97.7 °F (36.5 °C)        Labs/Imaging/Studies:   Recent Results (from the past 36 hour(s))   Comprehensive metabolic panel    Collection Time: 06/20/23 10:42 PM   Result Value Ref Range    Sodium Level 140 136 - 145 mmol/L    Potassium Level 3.7 3.5 - 5.1 mmol/L    Chloride 106 98 - 107 mmol/L    Carbon Dioxide 24 22 - 29 mmol/L    Glucose Level 112 (H) 74 - 100 mg/dL    Blood Urea Nitrogen 15.2 8.9 - 20.6 mg/dL    Creatinine 0.77 0.73 - 1.18 mg/dL    Calcium Level Total 9.4 8.4 - 10.2 mg/dL    Protein Total 6.7 6.4 - 8.3 gm/dL    Albumin Level 4.5 3.5 - 5.0 g/dL    Globulin 2.2 (L) 2.4 - 3.5 gm/dL    Albumin/Globulin Ratio 2.0 1.1 - 2.0 ratio    Bilirubin Total 0.2 <=1.5 mg/dL    Alkaline Phosphatase 69 40 - 150 unit/L    Alanine Aminotransferase 25 0 - 55 unit/L    Aspartate Aminotransferase 17 5 - 34 unit/L    eGFR >60 mls/min/1.73/m2   TSH    Collection Time: 06/20/23 10:42 PM   Result Value Ref Range    Thyroid Stimulating Hormone 1.676 0.350 - 4.940 uIU/mL   Ethanol    Collection Time: 06/20/23 10:42 PM   Result Value Ref Range    Ethanol Level <10.0 <=10.0 mg/dL   Acetaminophen level    Collection Time: 06/20/23 10:42 PM   Result Value Ref Range    Acetaminophen Level <17.4 (L) 17.4 - 30.0 ug/ml   CBC with Differential    Collection Time: 06/20/23 10:42 PM   Result Value Ref Range    WBC 10.87 4.50 - 11.50 x10(3)/mcL    RBC 5.09 4.70 - 6.10 x10(6)/mcL    Hgb 13.5 (L) 14.0 - 18.0 g/dL    Hct 42.4 42.0 - 52.0 %    MCV 83.3 80.0 - 94.0 fL    MCH 26.5 (L) 27.0 - 31.0 pg    MCHC 31.8 (L) 33.0 - 36.0 g/dL    RDW 13.6 11.5 - 17.0 %    Platelet 305 130 - 400 x10(3)/mcL    MPV 9.6 7.4 - 10.4 fL    Neut % 51.3 %    Lymph % 30.3 %    Mono % 10.6 %    Eos % 7.0 %    Basophil % 0.5 %    Lymph # 3.29 0.6 - 4.6 x10(3)/mcL    Neut # 5.59 2.1 - 9.2 x10(3)/mcL    Mono # 1.15 0.1 - 1.3 x10(3)/mcL    Eos # 0.76 0 - 0.9 x10(3)/mcL    Baso # 0.05 <=0.2 x10(3)/mcL    IG# 0.03 0 - 0.04 x10(3)/mcL    IG% 0.3 %     NRBC% 0.0 %   Urinalysis, Reflex to Urine Culture    Collection Time: 06/20/23 10:56 PM    Specimen: Urine   Result Value Ref Range    Color, UA Dark Yellow Yellow, Light-Yellow, Dark Yellow, Emma, Straw    Appearance, UA Clear Clear    Specific Gravity, UA 1.039 (H) 1.005 - 1.030    pH, UA 5.5 5.0 - 8.5    Protein, UA 1+ (A) Negative mg/dL    Glucose, UA Negative Negative, Normal mg/dL    Ketones, UA Trace (A) Negative mg/dL    Blood, UA Negative Negative unit/L    Bilirubin, UA 1+ (A) Negative mg/dL    Urobilinogen, UA 1.0 0.2, 1.0, Normal mg/dL    Nitrites, UA Negative Negative    Leukocyte Esterase, UA Negative Negative unit/L   Drug Screen, Urine    Collection Time: 06/20/23 10:56 PM   Result Value Ref Range    Amphetamines, Urine Negative Negative    Barbituates, Urine Negative Negative    Benzodiazepine, Urine Negative Negative    Cannabinoids, Urine Negative Negative    Cocaine, Urine Negative Negative    Fentanyl, Urine Negative Negative    MDMA, Urine Negative Negative    Opiates, Urine Negative Negative    Phencyclidine, Urine Negative Negative    pH, Urine 5.5 3.0 - 11.0    Specific Gravity, Urine Auto 1.039 (H) 1.001 - 1.035   COVID-19 Rapid Screening    Collection Time: 06/20/23 10:56 PM   Result Value Ref Range    SARS COV-2 MOLECULAR Negative Negative   Urinalysis, Microscopic    Collection Time: 06/20/23 10:56 PM   Result Value Ref Range    RBC, UA <5 <=5 /HPF    WBC, UA <5 <=5 /HPF    Squamous Epithelial Cells, UA <5 <=5 /HPF    Bacteria, UA None Seen None Seen, Rare, Occasional /HPF   Hemoglobin A1C    Collection Time: 06/22/23  7:41 AM   Result Value Ref Range    Hemoglobin A1c 5.4 <=7.0 %    Estimated Average Glucose 108.3 mg/dL   Glucose, Fasting    Collection Time: 06/22/23  7:41 AM   Result Value Ref Range    Glucose Fasting 92 70 - 155 mg/dL   CBC with Differential    Collection Time: 06/22/23  7:41 AM   Result Value Ref Range    WBC 7.08 4.50 - 11.50 x10(3)/mcL    RBC 5.16 4.70 - 6.10  x10(6)/mcL    Hgb 13.8 (L) 14.0 - 18.0 g/dL    Hct 43.3 42.0 - 52.0 %    MCV 83.9 80.0 - 94.0 fL    MCH 26.7 (L) 27.0 - 31.0 pg    MCHC 31.9 (L) 33.0 - 36.0 g/dL    RDW 13.6 11.5 - 17.0 %    Platelet 279 130 - 400 x10(3)/mcL    MPV 10.2 7.4 - 10.4 fL    Neut % 46.2 %    Lymph % 35.3 %    Mono % 8.9 %    Eos % 8.9 %    Basophil % 0.4 %    Lymph # 2.50 0.6 - 4.6 x10(3)/mcL    Neut # 3.27 2.1 - 9.2 x10(3)/mcL    Mono # 0.63 0.1 - 1.3 x10(3)/mcL    Eos # 0.63 0 - 0.9 x10(3)/mcL    Baso # 0.03 <=0.2 x10(3)/mcL    IG# 0.02 0 - 0.04 x10(3)/mcL    IG% 0.3 %    NRBC% 0.0 %          Medical Review Of Systems:  A comprehensive review of systems was negative.      Psychiatric Mental Status Exam:  General Appearance: appears stated age, well developed and nourished, adequately groomed and appropriately dressed, in no acute distress  Arousal: alert  Behavior: normal; cooperative; reasonably friendly, pleasant, and polite; appropriate eye-contact; under good behavioral control  Movements and Motor Activity: no abnormal involuntary movements noted; no tics, no tremors, no akathisia, no dystonia, no evidence of tardive dyskinesia; no psychomotor agitation or retardation  Orientation: oriented to person, place, time, and situation  Speech: normal volume, normal tone, normal pitch, conversational, rapid, pressured, loud  Mood: Manic  Affect: elevated  Thought Process: linear  Associations: intact  Thought Content and Perceptions: no suicidal or homicidal ideation, no auditory or visual hallucinations, no paranoid ideation, no ideas of reference, no evidence of delusions or psychosis  Recent and Remote Memory: intact; per interview/observation with patient  Attention and Concentration: intact; per interview/observation with patient  Fund of Knowledge: intact; based on history, vocabulary, fund of knowledge, syntax, grammar, and content  Insight: questionable; based on understanding of severity of illness and HPI  Judgment:  questionable; based on patient's behavior and HPI    ASSESSMENT/PLAN:   Problems Addressed/Diagnoses:  Mood Disorder NOS (F39)   Insomnia (F51.01)  Amphetamine use disorder (F15.20)      Past Medical History:   Diagnosis Date    Anxiety     Bipolar disorder     Depression     Digestive disorder     Headache(784.0)     History of psychiatric care     History of psychiatric hospitalization     Psychiatric exam requested by authority     Psychiatric problem     Schizoaffective disorder     Therapy         Plan:  Depression  -Zoloft 50mg daily     Insomnia  -Seroquel 100mg HS    Expected Disposition Plan:  [facility]        Giorgi Booker PMHNP-BC

## 2023-06-22 NOTE — NURSING
"Daily Nursing Note:      Behavior:    Patient (Pedro Moon is a 37 y.o. male, : 1985, MRN: [id number]) demonstrating an affect that was constricted. Pedro demonstrating mood that is depressed. Pedro had an appearance that was disheveled. Pedor denies suicidal ideation. Pedro denies suicide plan. Pedro denies homicidal ideation. Pedro denies hallucinations.    Pedro's  height is 5' 8" (1.727 m) and weight is 74.8 kg (165 lb). His temperature is 98.1 °F (36.7 °C). His blood pressure is 138/69 and his pulse is 82. His respiration is 18 and oxygen saturation is 100%.     Pedro's last BM was noted on: 2023.      Intervention:    Encourage Pedro to perform self-hygiene, grooming, and changing of clothing. Monitor Pedro's behavior and program compliance. Monitor Pedro for suicidal ideation, homicidal ideation, sleep disturbance, and hallucinations. Encourage Pedro to eat all portions of meals and assess for meal preferences. Monitor Pedro for intake and output to ensure hydration. Notify the Physician/Physician Assistant/Advance Practice Registered Nurse (MD/PA/APRN) for any medication refusal and any change in patient condition.      Response:    Pedro verbalizes understand of unit process and procedures.     Plan:     Continue to monitor per MD/PA/APRN orders; maintain patient safety.   "

## 2023-06-23 PROCEDURE — S4991 NICOTINE PATCH NONLEGEND: HCPCS | Performed by: PSYCHIATRY & NEUROLOGY

## 2023-06-23 PROCEDURE — 11400000 HC PSYCH PRIVATE ROOM

## 2023-06-23 PROCEDURE — 25000003 PHARM REV CODE 250

## 2023-06-23 PROCEDURE — 25000003 PHARM REV CODE 250: Performed by: PEDIATRICS

## 2023-06-23 PROCEDURE — 25000003 PHARM REV CODE 250: Performed by: PSYCHIATRY & NEUROLOGY

## 2023-06-23 RX ORDER — BENZTROPINE MESYLATE 1 MG/1
1 TABLET ORAL 2 TIMES DAILY
Status: DISCONTINUED | OUTPATIENT
Start: 2023-06-23 | End: 2023-06-26 | Stop reason: HOSPADM

## 2023-06-23 RX ORDER — GABAPENTIN 300 MG/1
300 CAPSULE ORAL 3 TIMES DAILY
Status: DISCONTINUED | OUTPATIENT
Start: 2023-06-23 | End: 2023-06-26 | Stop reason: HOSPADM

## 2023-06-23 RX ADMIN — BENZTROPINE MESYLATE 1 MG: 1 TABLET ORAL at 08:06

## 2023-06-23 RX ADMIN — SERTRALINE HYDROCHLORIDE 50 MG: 50 TABLET ORAL at 08:06

## 2023-06-23 RX ADMIN — QUETIAPINE FUMARATE 100 MG: 100 TABLET ORAL at 08:06

## 2023-06-23 RX ADMIN — NICOTINE 1 PATCH: 21 PATCH, EXTENDED RELEASE TRANSDERMAL at 08:06

## 2023-06-23 RX ADMIN — BENZTROPINE MESYLATE 1 MG: 1 TABLET ORAL at 11:06

## 2023-06-23 RX ADMIN — ACETAMINOPHEN 650 MG: 325 TABLET ORAL at 05:06

## 2023-06-23 RX ADMIN — GABAPENTIN 300 MG: 300 CAPSULE ORAL at 02:06

## 2023-06-23 RX ADMIN — GABAPENTIN 300 MG: 300 CAPSULE ORAL at 08:06

## 2023-06-23 RX ADMIN — PANTOPRAZOLE SODIUM 40 MG: 40 TABLET, DELAYED RELEASE ORAL at 08:06

## 2023-06-23 NOTE — CARE UPDATE
[facility] [phone] who confirmed pt will dc to his address [address] [place] [id number].

## 2023-06-23 NOTE — NURSING
"Daily Nursing Note:      Behavior:    Patient (Pedro Moon is a 37 y.o. male, : 1985, MRN: [id number]) demonstrating an affect that was congruent. Pedro demonstrating mood that is normal. Pedro had an appearance that was clean. Pedro denies suicidal ideation. Pedro denies suicide plan. Pedro denies homicidal ideation. Pedro denies hallucinations. No apparent delusions/paranoia.    Pedro's  height is 5' 8" (1.727 m) and weight is 74.8 kg (165 lb). His temperature is 97.7 °F (36.5 °C). His blood pressure is 128/88 and his pulse is 91. His respiration is 18 and oxygen saturation is 98%.     Pedro's last BM was noted on: 23.      Intervention:    Encourage Pedro to perform self-hygiene, grooming, and changing of clothing. Monitor Pedro's behavior and program compliance. Monitor Pedro for suicidal ideation, homicidal ideation, sleep disturbance, and hallucinations. Encourage Pedro to eat all portions of meals and assess for meal preferences. Monitor Pedro for intake and output to ensure hydration. Notify the Physician/Physician Assistant/Advance Practice Registered Nurse (MD/PA/APRN) for any medication refusal and any change in patient condition.      Response:    Pedro verbalizes understand of unit process and procedures. Pedro reported medications are helping to decrease his anxiety and depression, denies both. Interacts appropriately with staff and peers. No aggressive or threatening behaviors exhibited.      Plan:     Continue to monitor per MD/PA/APRN orders; maintain patient safety.   "

## 2023-06-23 NOTE — CONSULTS
CONSULT NOTE    Admit Date: 6/21/2023   LOS: 2 days     SUBJECTIVE:     Follow-up For:  pain in shoulder     Scheduled Meds:   benztropine  1 mg Oral BID    nicotine  1 patch Transdermal Daily    pantoprazole  40 mg Oral Daily    QUEtiapine  100 mg Oral QHS    sertraline  50 mg Oral Daily     Continuous Infusions:  PRN Meds:acetaminophen, aluminum-magnesium hydroxide-simethicone, diphenhydrAMINE **AND** LORazepam, diphenhydrAMINE, hydrOXYzine HCL, lorazepam, magnesium hydroxide 400 mg/5 ml, trazodone    Review of patient's allergies indicates:   Allergen Reactions    Haloperidol lactate Other (See Comments)     Shakes       Review of Systems  ROS muscle spasms from electrocution     OBJECTIVE:     Vital Signs (Most Recent)  Temp: 98.3 °F (36.8 °C) (06/23/23 0700)  Pulse: 75 (06/23/23 0700)  Resp: 18 (06/23/23 0700)  BP: 117/62 (06/23/23 0700)  SpO2: 97 % (06/23/23 0700)    Vital Signs Range (Last 24H):  Temp:  [98.3 °F (36.8 °C)]   Pulse:  [75]   Resp:  [18]   BP: (117)/(62)   SpO2:  [97 %]     I & O (Last 24H):No intake or output data in the 24 hours ending 06/23/23 1238  Physical Exam:  Physical Exam very twitchy and anxious     Laboratory:  No results found for this or any previous visit (from the past 24 hour(s)).         ASSESSMENT/PLAN:     Shoulder pain- adjust med regimen - on benztropine for EPS

## 2023-06-23 NOTE — GROUP NOTE
Group Psychotherapy       Group Focus: Medication education      Number of patients in attendance: 11    Group Start Time: 2030  Group End Time:  2100  Groups Date: 6/22/2023  Group Topic:  Behavioral Health  Group Department: [facility]  Group Facilitators:  Andie Wing RN  _____________________________________________________________________    Patient Name: Pedro Moon  MRN: [id number]  Patient Class: IP- Psych   Admission Date\Time: 6/21/2023  7:35 AM  Hospital Length of Stay: 1  Primary Care Provider: Primary Doctor No     Referred by: Acute Psychiatry Unit Treatment Team     Target symptoms: Depression     Patient's response to treatment: Active Listening     Progress toward goals: Progressing adequately     Interval History:      Diagnosis: Depression     Plan: Continue treatment on [facility]

## 2023-06-23 NOTE — NURSING
Pt is currently voicing no ADRs or physical complaints at this time, vital signs are stable,  Pt is not in any physical distress at the current time, currently voicing no suicidal or homicidal ideations at this time, pt admits to depression,anxiety, has racing thoughts, hyperverbal, intrusive with staff and peers,  Currently voicing no hallucinations or delusions, voicing no detox symptoms at this time,   Compliant with medication ordered, pt will be  Seen today by EDD Tay, will monitor with suicide prec., for anger and detox symptoms and will be assisted prn.

## 2023-06-23 NOTE — NURSING
Patient reports that tylenol was effective, no longer has a headache, pain level 0 on scale of 0-10.

## 2023-06-23 NOTE — NURSING
"PRN Administration Note:    Behavior:    Patient (Pedro Moon is a 37 y.o. male, : 1985, MRN: [id number])     Allergies: Haloperidol lactate    Pedro's  height is 5' 8" (1.727 m) and weight is 74.8 kg (165 lb). His temperature is 97.7 °F (36.5 °C). His blood pressure is 128/88 and his pulse is 91. His respiration is 18 and oxygen saturation is 98%.     Reason for PRN Administration: Pedro requested tylenol for headache, a 5 on scale of 0-10.    Intervention:    Administered Tylenol 650mg PO PRN headache per physician order to Pedro.       Response:    Pedro tolerated administration well.      Plan:     Continue to monitor per MD/PA/APRN orders; and reevaluate medication effectiveness.  "

## 2023-06-23 NOTE — PROGRESS NOTES
6/23/2023  Pedro Moon   1985   [id number]        Psychiatry Progress Note     Chief Complaint: I just said that I was suicidal    SUBJECTIVE:   Pedro Moon is a 37 y.o. male placed under a PEC at [facility] due to reported suicidal ideation.  He had recently been discharged from a rehab for methamphetamine use.    Today patient remains elevated but I am beginning to believe this may be his normal personality. He does continue to have spasms. He stated that he was taking Ingrezza however, we do not have this available. I will begin cogentin to address this as it could be related to his Seroquel but he states that this started when he was electrocuted in his [employer] cell. He denies any depression or anxiety. He continues to display forced rapid speech so I will monitor closely for any other manic symptoms and treat accordingly. Will continue to monitor.     Current Medications:   Scheduled Meds:    nicotine  1 patch Transdermal Daily    pantoprazole  40 mg Oral Daily    QUEtiapine  100 mg Oral QHS    sertraline  50 mg Oral Daily      PRN Meds: acetaminophen, aluminum-magnesium hydroxide-simethicone, diphenhydrAMINE **AND** LORazepam, diphenhydrAMINE, hydrOXYzine HCL, lorazepam, magnesium hydroxide 400 mg/5 ml, trazodone   Psychotherapeutics (From admission, onward)      Start     Stop Route Frequency Ordered    06/21/23 2100  QUEtiapine tablet 100 mg         -- Oral Nightly 06/21/23 0830    06/21/23 0900  sertraline tablet 50 mg         -- Oral Daily 06/21/23 0830    06/21/23 0736  traZODone tablet 100 mg         -- Oral Nightly PRN 06/21/23 0736    06/21/23 0736  LORazepam injection 2 mg         -- IM Every 4 hours PRN 06/21/23 0736    06/21/23 0736  LORazepam tablet 2 mg  (Med - Acute  Behavioral Management)        See Hyperspace for full Linked Orders Report.    -- Oral Every 4 hours PRN 06/21/23 0736            Allergies:   Review of patient's allergies indicates:   Allergen Reactions    Haloperidol lactate  Other (See Comments)     Lindy        OBJECTIVE:   Vitals   Vitals:    06/23/23 0700   BP: 117/62   Pulse: 75   Resp: 18   Temp: 98.3 °F (36.8 °C)        Labs/Imaging/Studies:   Recent Results (from the past 36 hour(s))   Hemoglobin A1C    Collection Time: 06/22/23  7:41 AM   Result Value Ref Range    Hemoglobin A1c 5.4 <=7.0 %    Estimated Average Glucose 108.3 mg/dL   SYPHILIS ANTIBODY (WITH REFLEX RPR)    Collection Time: 06/22/23  7:41 AM   Result Value Ref Range    Syphilis Antibody Nonreactive Nonreactive, Equivocal   Glucose, Fasting    Collection Time: 06/22/23  7:41 AM   Result Value Ref Range    Glucose Fasting 92 70 - 155 mg/dL   CBC with Differential    Collection Time: 06/22/23  7:41 AM   Result Value Ref Range    WBC 7.08 4.50 - 11.50 x10(3)/mcL    RBC 5.16 4.70 - 6.10 x10(6)/mcL    Hgb 13.8 (L) 14.0 - 18.0 g/dL    Hct 43.3 42.0 - 52.0 %    MCV 83.9 80.0 - 94.0 fL    MCH 26.7 (L) 27.0 - 31.0 pg    MCHC 31.9 (L) 33.0 - 36.0 g/dL    RDW 13.6 11.5 - 17.0 %    Platelet 279 130 - 400 x10(3)/mcL    MPV 10.2 7.4 - 10.4 fL    Neut % 46.2 %    Lymph % 35.3 %    Mono % 8.9 %    Eos % 8.9 %    Basophil % 0.4 %    Lymph # 2.50 0.6 - 4.6 x10(3)/mcL    Neut # 3.27 2.1 - 9.2 x10(3)/mcL    Mono # 0.63 0.1 - 1.3 x10(3)/mcL    Eos # 0.63 0 - 0.9 x10(3)/mcL    Baso # 0.03 <=0.2 x10(3)/mcL    IG# 0.02 0 - 0.04 x10(3)/mcL    IG% 0.3 %    NRBC% 0.0 %          Medical Review Of Systems:  A comprehensive review of systems was negative.      Psychiatric Mental Status Exam:  General Appearance: appears stated age, well developed and nourished, adequately groomed and appropriately dressed, in no acute distress  Arousal: alert  Behavior: normal; cooperative; reasonably friendly, pleasant, and polite; appropriate eye-contact; under good behavioral control  Movements and Motor Activity: no abnormal involuntary movements noted; no tics, no tremors, no akathisia, no dystonia, no evidence of tardive dyskinesia; no psychomotor  agitation or retardation  Orientation: oriented to person, place, time, and situation  Speech: normal volume, normal tone, normal pitch, conversational, rapid, pressured, loud  Mood: Manic  Affect: elevated  Thought Process: linear  Associations: intact  Thought Content and Perceptions: no suicidal or homicidal ideation, no auditory or visual hallucinations, no paranoid ideation, no ideas of reference, no evidence of delusions or psychosis  Recent and Remote Memory: intact; per interview/observation with patient  Attention and Concentration: intact; per interview/observation with patient  Fund of Knowledge: intact; based on history, vocabulary, fund of knowledge, syntax, grammar, and content  Insight: questionable; based on understanding of severity of illness and HPI  Judgment: questionable; based on patient's behavior and HPI    ASSESSMENT/PLAN:   Problems Addressed/Diagnoses:  Mood Disorder NOS (F39)   Insomnia (F51.01)  Amphetamine use disorder (F15.20)      Past Medical History:   Diagnosis Date    Anxiety     Bipolar disorder     Depression     Digestive disorder     Headache(784.0)     History of psychiatric care     History of psychiatric hospitalization     Psychiatric exam requested by authority     Psychiatric problem     Schizoaffective disorder     Therapy         Plan:  Depression  -Zoloft 50mg daily     Insomnia  -Seroquel 100mg HS    Cogentin 1 mg BID    Expected Disposition Plan:  [facility]

## 2023-06-24 PROCEDURE — 25000003 PHARM REV CODE 250: Performed by: PEDIATRICS

## 2023-06-24 PROCEDURE — 25000003 PHARM REV CODE 250

## 2023-06-24 PROCEDURE — S4991 NICOTINE PATCH NONLEGEND: HCPCS | Performed by: PSYCHIATRY & NEUROLOGY

## 2023-06-24 PROCEDURE — 25000003 PHARM REV CODE 250: Performed by: PSYCHIATRY & NEUROLOGY

## 2023-06-24 PROCEDURE — 11400000 HC PSYCH PRIVATE ROOM

## 2023-06-24 RX ADMIN — ACETAMINOPHEN 650 MG: 325 TABLET ORAL at 05:06

## 2023-06-24 RX ADMIN — PANTOPRAZOLE SODIUM 40 MG: 40 TABLET, DELAYED RELEASE ORAL at 08:06

## 2023-06-24 RX ADMIN — SERTRALINE HYDROCHLORIDE 50 MG: 50 TABLET ORAL at 08:06

## 2023-06-24 RX ADMIN — ALUMINUM HYDROXIDE, MAGNESIUM HYDROXIDE, AND SIMETHICONE 30 ML: 200; 200; 20 SUSPENSION ORAL at 04:06

## 2023-06-24 RX ADMIN — GABAPENTIN 300 MG: 300 CAPSULE ORAL at 08:06

## 2023-06-24 RX ADMIN — NICOTINE 1 PATCH: 21 PATCH, EXTENDED RELEASE TRANSDERMAL at 08:06

## 2023-06-24 RX ADMIN — GABAPENTIN 300 MG: 300 CAPSULE ORAL at 03:06

## 2023-06-24 RX ADMIN — BENZTROPINE MESYLATE 1 MG: 1 TABLET ORAL at 08:06

## 2023-06-24 RX ADMIN — QUETIAPINE FUMARATE 100 MG: 100 TABLET ORAL at 08:06

## 2023-06-24 NOTE — NURSING
Pt with complaint of heartburn, will receive   Mylanta 30 ccs po prn heartburn and monitor for effectiveness.

## 2023-06-24 NOTE — PLAN OF CARE
Problem: Adult Inpatient Plan of Care  Goal: Plan of Care Review  Outcome: Ongoing, Progressing  Goal: Patient-Specific Goal (Individualized)  Outcome: Ongoing, Progressing  Goal: Absence of Hospital-Acquired Illness or Injury  Outcome: Ongoing, Progressing  Goal: Optimal Comfort and Wellbeing  Outcome: Ongoing, Progressing  Goal: Readiness for Transition of Care  Outcome: Ongoing, Progressing     Problem: Violence Risk or Actual  Goal: Anger and Impulse Control  Outcome: Ongoing, Progressing     Problem: Cognitive Impairment (Depressive Signs/Symptoms)  Goal: Optimized Cognitive Function  Outcome: Ongoing, Progressing     Problem: Decreased Participation/Engagement (Depressive Signs/Symptoms)  Goal: Increased Participation and Engagement (Depressive Signs/Symptoms)  Outcome: Ongoing, Progressing     Problem: Feelings of Worthlessness, Hopelessness or Excessive Guilt (Depressive Signs/Symptoms)  Goal: Enhanced Self-Esteem and Confidence (Depressive Signs/Symptoms)  Outcome: Ongoing, Progressing

## 2023-06-24 NOTE — NURSING
Pt is voicing no ADRs or physical complaints at this time, vital signs are stable, pt is not in any physical distress at this time, currently voicing no suicidal or homicidal ideations at this time,  Reports decrease in depression, pt does appear anxious at times, hyperverbal, intrusive at times, voicing no hallucinations or delusions  At this time, voicing no detox symptoms at this  Time, pt was seen by EDD Tay yest., pt is  Compliant with medication ordered, pt will be  Monitored with suicide prec., for anger and detox symptoms and will be assisted prn.

## 2023-06-24 NOTE — NURSING
"Daily Nursing Note:      Behavior:    Patient (Pedro Moon is a 37 y.o. male, : 1985, MRN: [id number]) demonstrating an affect that was anxious. Pedro demonstrating mood that is anxious. Pedro had an appearance that was disheveled. Pedro denies suicidal ideation. Pedro denies suicide plan. Pedro denies homicidal ideation. Pedro denies hallucinations.    Pedro's  height is 5' 8" (1.727 m) and weight is 74.8 kg (165 lb). His oral temperature is 98.3 °F (36.8 °C). His blood pressure is 117/62 and his pulse is 75. His respiration is 18 and oxygen saturation is 97%.       Intervention:    Encourage Pedro to perform self-hygiene, grooming, and changing of clothing. Monitor Pedro's behavior and program compliance. Monitor Pedro for suicidal ideation, homicidal ideation, sleep disturbance, and hallucinations. Encourage Pedro to eat all portions of meals and assess for meal preferences. Monitor Pedro for intake and output to ensure hydration. Notify the Physician/Physician Assistant/Advance Practice Registered Nurse (MD/PA/APRN) for any medication refusal and any change in patient condition.      Response:    Pedro verbalizes understand of unit process and procedures.      Plan:     Continue to monitor per MD/PA/APRN orders; maintain patient safety.   "

## 2023-06-25 PROCEDURE — 25000003 PHARM REV CODE 250

## 2023-06-25 PROCEDURE — 25000003 PHARM REV CODE 250: Performed by: PEDIATRICS

## 2023-06-25 PROCEDURE — 25000003 PHARM REV CODE 250: Performed by: PSYCHIATRY & NEUROLOGY

## 2023-06-25 PROCEDURE — S4991 NICOTINE PATCH NONLEGEND: HCPCS | Performed by: PSYCHIATRY & NEUROLOGY

## 2023-06-25 PROCEDURE — 11400000 HC PSYCH PRIVATE ROOM

## 2023-06-25 RX ADMIN — PANTOPRAZOLE SODIUM 40 MG: 40 TABLET, DELAYED RELEASE ORAL at 08:06

## 2023-06-25 RX ADMIN — QUETIAPINE FUMARATE 100 MG: 100 TABLET ORAL at 08:06

## 2023-06-25 RX ADMIN — BENZTROPINE MESYLATE 1 MG: 1 TABLET ORAL at 08:06

## 2023-06-25 RX ADMIN — GABAPENTIN 300 MG: 300 CAPSULE ORAL at 08:06

## 2023-06-25 RX ADMIN — GABAPENTIN 300 MG: 300 CAPSULE ORAL at 02:06

## 2023-06-25 RX ADMIN — SERTRALINE HYDROCHLORIDE 50 MG: 50 TABLET ORAL at 08:06

## 2023-06-25 RX ADMIN — NICOTINE 1 PATCH: 21 PATCH, EXTENDED RELEASE TRANSDERMAL at 08:06

## 2023-06-25 NOTE — NURSING
Pt is currently voicing no ADRs or physical complaints at this time, vital signs are stable,  Pt is not in any physical distress at this time,   Currently voicing no suicidal or homicidal ideations at this time, pt is anxious, restless,  Hyperverbal, intrusive, at times needs redirection, currently voicing no hallucinations  Or delusions at this time, voicing no detox  Symptoms at this time, compliant with medication ordered, will monitor with suicide  Prec., for anger, psychosis and detox symptoms  And will be assisted prn, pt will be seen by the  Psychiatrist tomorrow.

## 2023-06-25 NOTE — NURSING
"Daily Nursing Note:      Behavior:    Patient (Pedro Moon is a 37 y.o. male, : 1985, MRN: [id number]) demonstrating an affect that was anxious. Pedro demonstrating mood that is anxious. Pedro had an appearance that was disheveled. Pedro denies suicidal ideation. Pedro denies suicide plan. Pedro denies homicidal ideation. Pedro denies hallucinations.    Pedro's  height is 5' 8" (1.727 m) and weight is 74.8 kg (165 lb). His temperature is 98.6 °F (37 °C). His blood pressure is 104/72 and his pulse is 84. His respiration is 16 and oxygen saturation is 98%.       Intervention:    Encourage Pedro to perform self-hygiene, grooming, and changing of clothing. Monitor Pedro's behavior and program compliance. Monitor Pedro for suicidal ideation, homicidal ideation, sleep disturbance, and hallucinations. Encourage Pedro to eat all portions of meals and assess for meal preferences. Monitor Pedro for intake and output to ensure hydration. Notify the Physician/Physician Assistant/Advance Practice Registered Nurse (MD/PA/APRN) for any medication refusal and any change in patient condition.      Response:    Pedro verbalizes understand of unit process and procedures.      Plan:     Continue to monitor per MD/PA/APRN orders; maintain patient safety.   "

## 2023-06-25 NOTE — PLAN OF CARE
Problem: Adult Inpatient Plan of Care  Goal: Plan of Care Review  Outcome: Ongoing, Progressing     Problem: Violence Risk or Actual  Goal: Anger and Impulse Control  Outcome: Ongoing, Progressing     Problem: Activity and Energy Impairment (Depressive Signs/Symptoms)  Goal: Optimized Energy Level (Depressive Signs/Symptoms)  Outcome: Ongoing, Progressing     Problem: Decreased Participation/Engagement (Depressive Signs/Symptoms)  Goal: Increased Participation and Engagement (Depressive Signs/Symptoms)  Outcome: Ongoing, Progressing     Problem: Mood Impairment (Depressive Signs/Symptoms)  Goal: Improved Mood Symptoms (Depressive Signs/Symptoms)  Outcome: Ongoing, Progressing

## 2023-06-26 VITALS
SYSTOLIC BLOOD PRESSURE: 114 MMHG | OXYGEN SATURATION: 96 % | WEIGHT: 165 LBS | DIASTOLIC BLOOD PRESSURE: 69 MMHG | RESPIRATION RATE: 18 BRPM | HEIGHT: 68 IN | TEMPERATURE: 98 F | HEART RATE: 91 BPM | BODY MASS INDEX: 25.01 KG/M2

## 2023-06-26 PROBLEM — F15.20 METHAMPHETAMINE ADDICTION: Status: ACTIVE | Noted: 2023-06-26

## 2023-06-26 PROBLEM — F33.1 MAJOR DEPRESSIVE DISORDER, RECURRENT EPISODE, MODERATE: Status: ACTIVE | Noted: 2023-06-26

## 2023-06-26 PROBLEM — F51.01 PRIMARY INSOMNIA: Status: ACTIVE | Noted: 2023-06-26

## 2023-06-26 PROCEDURE — 25000003 PHARM REV CODE 250

## 2023-06-26 PROCEDURE — 25000003 PHARM REV CODE 250: Performed by: PSYCHIATRY & NEUROLOGY

## 2023-06-26 PROCEDURE — 25000003 PHARM REV CODE 250: Performed by: PEDIATRICS

## 2023-06-26 RX ORDER — GABAPENTIN 300 MG/1
300 CAPSULE ORAL 3 TIMES DAILY
Qty: 90 CAPSULE | Refills: 0 | Status: SHIPPED | OUTPATIENT
Start: 2023-06-26 | End: 2024-06-25

## 2023-06-26 RX ORDER — BENZTROPINE MESYLATE 1 MG/1
1 TABLET ORAL 2 TIMES DAILY
Qty: 60 TABLET | Refills: 0 | Status: SHIPPED | OUTPATIENT
Start: 2023-06-26 | End: 2024-06-25

## 2023-06-26 RX ORDER — IBUPROFEN 200 MG
1 TABLET ORAL DAILY
Qty: 28 PATCH | Refills: 0 | Status: SHIPPED | OUTPATIENT
Start: 2023-06-27

## 2023-06-26 RX ORDER — SERTRALINE HYDROCHLORIDE 50 MG/1
50 TABLET, FILM COATED ORAL DAILY
Qty: 30 TABLET | Refills: 0 | Status: SHIPPED | OUTPATIENT
Start: 2023-06-27 | End: 2024-06-26

## 2023-06-26 RX ORDER — PANTOPRAZOLE SODIUM 40 MG/1
40 TABLET, DELAYED RELEASE ORAL DAILY
Qty: 30 TABLET | Refills: 0 | Status: SHIPPED | OUTPATIENT
Start: 2023-06-27 | End: 2024-06-26

## 2023-06-26 RX ORDER — QUETIAPINE FUMARATE 100 MG/1
100 TABLET, FILM COATED ORAL NIGHTLY
Qty: 30 TABLET | Refills: 0 | Status: SHIPPED | OUTPATIENT
Start: 2023-06-26 | End: 2024-06-25

## 2023-06-26 RX ADMIN — GABAPENTIN 300 MG: 300 CAPSULE ORAL at 08:06

## 2023-06-26 RX ADMIN — BENZTROPINE MESYLATE 1 MG: 1 TABLET ORAL at 08:06

## 2023-06-26 RX ADMIN — SERTRALINE HYDROCHLORIDE 50 MG: 50 TABLET ORAL at 08:06

## 2023-06-26 RX ADMIN — PANTOPRAZOLE SODIUM 40 MG: 40 TABLET, DELAYED RELEASE ORAL at 08:06

## 2023-06-26 NOTE — NURSING
Discharge Note:    Pedro Moon is a 37 y.o. male, : 1985, MRN: [id number], admitted on 2023 for Clifford Levin MD with a diagnosis of MDD (major depressive disorder) [F32.9].    Patient discharged on 2023 per physician orders in stable condition. Patient denied suicidal ideation, homicidal ideation, or hallucinations. Patient was discharged with valuables, personal belongings, prescriptions, discharge instructions, and an educational handout explaining the diagnosis and prescribed medications. Patient verbalized understanding of the discharge instructions and importance of follow-up visits. Patient was escorted out of the facility by [facility] and placed into a Medicaid transportation to be transported to home.     Patient discharged on the following medications:     Medication List        START taking these medications      benztropine 1 MG tablet  Commonly known as: COGENTIN  Take 1 tablet (1 mg total) by mouth 2 (two) times daily.     nicotine 21 mg/24 hr  Commonly known as: NICODERM CQ  Place 1 patch onto the skin once daily.  Start taking on: 2023     pantoprazole 40 MG tablet  Commonly known as: PROTONIX  Take 1 tablet (40 mg total) by mouth once daily.  Start taking on: 2023     sertraline 50 MG tablet  Commonly known as: ZOLOFT  Take 1 tablet (50 mg total) by mouth once daily.  Start taking on: 2023            CHANGE how you take these medications      gabapentin 300 MG capsule  Commonly known as: NEURONTIN  Take 1 capsule (300 mg total) by mouth 3 (three) times daily.  What changed: when to take this     QUEtiapine 100 MG Tab  Commonly known as: SEROQUEL  Take 1 tablet (100 mg total) by mouth every evening.  What changed:   medication strength  how much to take            STOP taking these medications      amantadine  mg capsule  Commonly known as: SYMMETREL     citalopram 20 MG tablet  Commonly known as: CeleXA     omeprazole 40 MG  capsule  Commonly known as: PRILOSEC     propranoloL 20 MG tablet  Commonly known as: INDERAL               Where to Get Your Medications        You can get these medications from any pharmacy    Bring a paper prescription for each of these medications  benztropine 1 MG tablet  gabapentin 300 MG capsule  nicotine 21 mg/24 hr  pantoprazole 40 MG tablet  QUEtiapine 100 MG Tab  sertraline 50 MG tablet

## 2023-06-26 NOTE — NURSING
"Daily Nursing Note:      Behavior:    Patient (Pedro Moon is a 37 y.o. male, : 1985, MRN: [id number]) demonstrating an affect that was anxious. Pedro demonstrating mood that is anxious. Pedro had an appearance that was poor hygiene. Pedro denies suicidal ideation. Pedro denies suicide plan. Pedro denies homicidal ideation. Pedro denies hallucinations.    Pedro's  height is 5' 8" (1.727 m) and weight is 74.8 kg (165 lb). His temperature is 98.1 °F (36.7 °C). His blood pressure is 114/55 (abnormal) and his pulse is 84. His respiration is 18 and oxygen saturation is 96%.     Pedro's last BM was noted on: _______      Intervention:    Encourage Pedro to perform self-hygiene, grooming, and changing of clothing. Monitor Pedro's behavior and program compliance. Monitor Pedro for suicidal ideation, homicidal ideation, sleep disturbance, and hallucinations. Encourage Pedro to eat all portions of meals and assess for meal preferences. Monitor Pedro for intake and output to ensure hydration. Notify the Physician/Physician Assistant/Advance Practice Registered Nurse (MD/PA/APRN) for any medication refusal and any change in patient condition.      Response:    Pedro verbalizes understand of unit process and procedures. Pedro reported medications ______.      Plan:     Continue to monitor per MD/PA/APRN orders; maintain patient safety.   "

## 2023-06-26 NOTE — DISCHARGE SUMMARY
"DISCHARGE SUMMARY  PSYCHIATRY      Admit Date: 6/21/2023  7:35 AM    Discharge Date:  6/26/2023    SITE:   [facility]    Discharge Attending Physician: Clifford Levin M.D.    Chief Complaint:  "I come out here to go to a sober living house"    History of Present Illness On Admit:   Pedro Moon is a 37 y.o. male placed under a PEC at [facility] due to reported suicidal ideation.  He had recently been discharged from a rehab for methamphetamine use.     Patient states that he "came out here to go to a sober living house."  However, he states that someone was attempting to talk to him but wasn't directly addressing him and he was unhappy with this.  He states that he was at [facility], then [facility] but left because there were roaches.  He then went back to [facility] and was sent to sober [facility].  This all took place yesterday.  His current discharge plan is to go to sober living at discharge.  Admitted for medication management and safety monitoring.     UDS: (-)  Blood alcohol: <10    Admit Mental Status Exam:  General Appearance: appears stated age, well-developed, well-nourished  Arousal: alert  Behavior: cooperative  Movements and Motor Activity: no abnormal involuntary movements noted  Orientation: oriented to person, place, time, and situation  Speech: normal rate, normal rhythm, normal volume, normal tone  Mood: Depressed  Affect: mood-congruent, constricted  Thought Process: linear  Associations: intact  Thought Content and Perceptions: (+)suicidal ideation, no homicidal ideation, no auditory hallucinations, no visual hallucinations, no paranoid ideation, no ideas of reference  Recent and Remote Memory: recent memory intact, remote memory intact; per interview/observation with patient  Attention and Concentration: intact, attentive to conversation; per interview/observation with patient  Fund of Knowledge: intact, aware of current events, " vocabulary appropriate; based on history, vocabulary, fund of knowledge, syntax, grammar, and content  Insight: questionable; based on understanding of severity of illness and HPI  Judgment: questionable; based on patient's behavior and HPI      Diagnoses:  PRINCIPAL PROBLEM:  Major depressive disorder, recurrent episode, moderate      PROBLEM LIST    Major depressive disorder, recurrent episode, moderate    Tobacco user    Amphetamine abuse    Bipolar 1 disorder    Primary insomnia    Methamphetamine addiction        Hospital Course:   Patient was admitted to [facility] and started on Zoloft 50mg daily and Seroquel 100mg HS.    6/22/23  Today patient was very elevated but in a good mood. He denies any depression or anxiety. He states that he said that so that he wouldn't be homeless. Patient states that he has no thoughts of wanting to harm himself today. Patient was smiling and laughing appropriately during the exam. He did display forced rapid speech but at this time I am unsure if this is his normal personality or if he is manic. Will continue to monitor and address if this becomes an issue.     6/23/23  Today patient remains elevated but I am beginning to believe this may be his normal personality. He does continue to have spasms. He stated that he was taking Ingrezza however, we do not have this available. I will begin cogentin to address this as it could be related to his Seroquel but he states that this started when he was electrocuted in his shelter cell. He denies any depression or anxiety.    6/26/23  This morning, he states that his discharge plan is to go to his father's house (previously was to go to a sober living).  The medication was tolerated well with no noted adverse effects.  At the time of discharge, no thoughts of self-harm or harm to others noted.  Will discharge home today.      DISCHARGE EXAMINATION    VITALS   Vitals:    06/22/23 0701 06/23/23 0700 06/24/23 0701 06/25/23 0705   BP: 128/88 117/62  "104/72 (!) 114/55   BP Location:  Left arm     Patient Position:  Sitting     Pulse: 91 75 84 84   Resp: 18 18 16 18   Temp: 97.7 °F (36.5 °C) 98.3 °F (36.8 °C) 98.6 °F (37 °C) 98.1 °F (36.7 °C)   TempSrc:  Oral     SpO2: 98% 97% 98% 96%   Weight:       Height:             Discharge Mental Status Exam:  General Appearance: appears stated age, well-developed, well-nourished  Arousal: alert  Behavior: cooperative  Movements and Motor Activity: no abnormal involuntary movements noted  Orientation: oriented to person, place, time, and situation  Speech: normal rate, normal rhythm, normal volume, normal tone  Mood: "Ok"  Affect: elevated, energetic  Thought Process: linear  Associations: intact  Thought Content and Perceptions: dneies suicidal ideation, no homicidal ideation, no auditory hallucinations, no visual hallucinations, no paranoid ideation, no ideas of reference  Recent and Remote Memory: recent memory intact, remote memory intact; per interview/observation with patient  Attention and Concentration: intact, attentive to conversation; per interview/observation with patient  Fund of Knowledge: intact, aware of current events, vocabulary appropriate; based on history, vocabulary, fund of knowledge, syntax, grammar, and content  Insight: questionable; based on understanding of severity of illness and HPI  Judgment: questionable; based on patient's behavior and HPI      Discharge Condition:  Stable    Prognosis:  Fair    Justification for >1 antipsychotic:  N/a    Disposition:  discharged to home    Follow-up:  [facility]      Medication Regimen:    Current Facility-Administered Medications:     acetaminophen tablet 650 mg, 650 mg, Oral, Q6H PRN, Clifford Levin MD, 650 mg at 06/24/23 0507    aluminum-magnesium hydroxide-simethicone 200-200-20 mg/5 mL suspension 30 mL, 30 mL, Oral, Q6H PRN, Clifford Levin MD, 30 mL at 06/24/23 1613    benztropine tablet 1 mg, 1 mg, Oral, BID, Giorgi" Green, PMHNP, 1 mg at 06/26/23 0822    diphenhydrAMINE capsule 50 mg, 50 mg, Oral, Q4H PRN **AND** LORazepam tablet 2 mg, 2 mg, Oral, Q4H PRN, Clifford Levin MD    diphenhydrAMINE injection 50 mg, 50 mg, Intramuscular, Q4H PRN, Clifford Levin MD    gabapentin capsule 300 mg, 300 mg, Oral, TID, Dejan Crawford MD, 300 mg at 06/26/23 0823    hydrOXYzine tablet 50 mg, 50 mg, Oral, Q4H PRN, Clifford Levin MD    LORazepam injection 2 mg, 2 mg, Intramuscular, Q4H PRN, Clifford Levin MD    magnesium hydroxide 400 mg/5 ml suspension 2,400 mg, 30 mL, Oral, Daily PRN, Clifford Levin MD    nicotine 21 mg/24 hr 1 patch, 1 patch, Transdermal, Daily, Clifford Levin MD, 1 patch at 06/25/23 0815    pantoprazole EC tablet 40 mg, 40 mg, Oral, Daily, Dejan Crawford MD, 40 mg at 06/26/23 0822    QUEtiapine tablet 100 mg, 100 mg, Oral, QHS, Clifford Levin MD, 100 mg at 06/25/23 2015    sertraline tablet 50 mg, 50 mg, Oral, Daily, Clifford Levin MD, 50 mg at 06/26/23 0822    traZODone tablet 100 mg, 100 mg, Oral, Nightly PRN, Clifford Levin MD      Patient Instructions:   Continue medication regimen as prescribed.    Disposition plan per  - see  notes for details.    Patient instructed to call 911 or present to emergency department if any of the following complications develop status post discharge: suicidality, homicidality, or grave disability.     Total time spent discharging patient: <30 minutes      Clifford Levin M.D.

## 2023-06-26 NOTE — PROGRESS NOTES
"6/26/2023  Pedro Moon   1985   [id number]        Psychiatry Progress Note     Chief Complaint: "I been doing better"    SUBJECTIVE:   Pedro Moon is a 37 y.o. male placed under a PEC at [facility] due to reported suicidal ideation.  He had recently been discharged from a rehab for methamphetamine use.     This morning, he states that his discharge plan is to go to his father's house (previously was to go to a sober living).  The medication was tolerated well with no noted adverse effects.  At the time of discharge, no thoughts of self-harm or harm to others noted.  Will discharge home today.     UDS: (-)  Blood alcohol: <10     Current Medications:   Scheduled Meds:    benztropine  1 mg Oral BID    gabapentin  300 mg Oral TID    nicotine  1 patch Transdermal Daily    pantoprazole  40 mg Oral Daily    QUEtiapine  100 mg Oral QHS    sertraline  50 mg Oral Daily      PRN Meds: acetaminophen, aluminum-magnesium hydroxide-simethicone, diphenhydrAMINE **AND** LORazepam, diphenhydrAMINE, hydrOXYzine HCL, lorazepam, magnesium hydroxide 400 mg/5 ml, trazodone   Psychotherapeutics (From admission, onward)      Start     Stop Route Frequency Ordered    06/21/23 2100  QUEtiapine tablet 100 mg         -- Oral Nightly 06/21/23 0830    06/21/23 0900  sertraline tablet 50 mg         -- Oral Daily 06/21/23 0830    06/21/23 0736  traZODone tablet 100 mg         -- Oral Nightly PRN 06/21/23 0736    06/21/23 0736  LORazepam injection 2 mg         -- IM Every 4 hours PRN 06/21/23 0736    06/21/23 0736  LORazepam tablet 2 mg  (Med - Acute  Behavioral Management)        See Hyperspace for full Linked Orders Report.    -- Oral Every 4 hours PRN 06/21/23 0736            Allergies:   Review of patient's allergies indicates:   Allergen Reactions    Haloperidol lactate Other (See Comments)     Shagaye        OBJECTIVE:   Vitals   Vitals:    06/25/23 0705   BP: (!) 114/55   Pulse: 84   Resp: 18   Temp: 98.1 °F (36.7 °C)    " "    Labs/Imaging/Studies:   No results found for this or any previous visit (from the past 36 hour(s)).       Medical Review Of Systems:  Constitutional: negative  Respiratory: negative  Cardiovascular: negative  Gastrointestinal: negative  Genitourinary:negative  Musculoskeletal:negative  Neurological: negative      Psychiatric Mental Status Exam:  General Appearance: appears stated age, well-developed, well-nourished  Arousal: alert  Behavior: cooperative  Movements and Motor Activity: no abnormal involuntary movements noted  Orientation: oriented to person, place, time, and situation  Speech: normal rate, normal rhythm, normal volume, normal tone  Mood: "Ok"  Affect: elevated, energetic  Thought Process: linear  Associations: intact  Thought Content and Perceptions: dneies suicidal ideation, no homicidal ideation, no auditory hallucinations, no visual hallucinations, no paranoid ideation, no ideas of reference  Recent and Remote Memory: recent memory intact, remote memory intact; per interview/observation with patient  Attention and Concentration: intact, attentive to conversation; per interview/observation with patient  Fund of Knowledge: intact, aware of current events, vocabulary appropriate; based on history, vocabulary, fund of knowledge, syntax, grammar, and content  Insight: questionable; based on understanding of severity of illness and HPI  Judgment: questionable; based on patient's behavior and HPI      ASSESSMENT/PLAN:   Problems Addressed/Diagnoses:  Major Depressive Disorder, recurrent, moderate (F33.1)  Insomnia (F51.01)  Amphetamine use disorder (F15.20)    Past Medical History:   Diagnosis Date    Anxiety     Bipolar disorder     Depression     Digestive disorder     Headache(784.0)     History of psychiatric care     History of psychiatric hospitalization     Psychiatric exam requested by authority     Psychiatric problem     Schizoaffective disorder     Therapy     "     Plan:  Depression  -Continue Zoloft     Insomnia  -Continue Seroquel     Amphetamine use  -Group/Individual psychotherapy    Expected Disposition Plan:  Father's house  today        Clifford Levin M.D.

## 2023-06-26 NOTE — NURSING
"Daily Nursing Note:      Behavior:    Patient (Pedro Moon is a 37 y.o. male, : 1985, MRN: [id number]) demonstrating an affect that was  labile. Pedro demonstrating mood that is anxious. Pedro had an appearance that was disheveled. Pedro denies suicidal ideation. Pedro denies suicide plan. Pedro denies homicidal ideation. Pedro denies hallucinations.    Pedro's  height is 5' 8" (1.727 m) and weight is 74.8 kg (165 lb). His temperature is 98.1 °F (36.7 °C). His blood pressure is 114/55 (abnormal) and his pulse is 84. His respiration is 18 and oxygen saturation is 96%.     Pedro's last BM was noted on: __2023  _____      Intervention:    Encourage Pedro to perform self-hygiene, grooming, and changing of clothing. Monitor Pedro's behavior and program compliance. Monitor Pedro for suicidal ideation, homicidal ideation, sleep disturbance, and hallucinations. Encourage Pedro to eat all portions of meals and assess for meal preferences. Monitor Pedro for intake and output to ensure hydration. Notify the Physician/Physician Assistant/Advance Practice Registered Nurse (MD/PA/APRN) for any medication refusal and any change in patient condition.      Response:    Pedro verbalizes understand of unit process and procedures. Pedro reported medications __ Effectiveness with decreasing anxiey, depression and psychosis.____.      Plan:     Continue to monitor per MD/PA/APRN orders; maintain patient safety.    "

## 2023-06-26 NOTE — GROUP NOTE
Group Psychotherapy       Group Focus: Communication Skills      Number of patients in attendance: 6    Group Start Time: 1045  Group End Time:  1130  Groups Date: 6/26/2023  Group Topic:  Behavioral Health  Group Department: [facility]  Group Facilitators:  Rica Fink  _____________________________________________________________________    Patient Name: Pedro Moon  MRN: [id number]  Patient Class: IP- Psych   Admission Date\Time: 6/21/2023  7:35 AM  Hospital Length of Stay: 5  Primary Care Provider: Primary Doctor No     Referred by: Acute Psychiatry Unit Treatment Team     Target symptoms: Substance Abuse     Patient's response to treatment: Self-disclosure     Progress toward goals: Progressing adequately     Interval History:      Diagnosis:      Plan: Continue treatment on [facility]